# Patient Record
Sex: FEMALE | Race: BLACK OR AFRICAN AMERICAN | Employment: OTHER | URBAN - METROPOLITAN AREA
[De-identification: names, ages, dates, MRNs, and addresses within clinical notes are randomized per-mention and may not be internally consistent; named-entity substitution may affect disease eponyms.]

---

## 2017-03-24 ENCOUNTER — HOSPITAL ENCOUNTER (INPATIENT)
Age: 82
LOS: 7 days | Discharge: HOME | DRG: 192 | End: 2017-03-31
Attending: EMERGENCY MEDICINE | Admitting: HOSPITALIST
Payer: MEDICARE

## 2017-03-24 ENCOUNTER — APPOINTMENT (OUTPATIENT)
Dept: GENERAL RADIOLOGY | Age: 82
DRG: 192 | End: 2017-03-24
Attending: EMERGENCY MEDICINE
Payer: MEDICARE

## 2017-03-24 DIAGNOSIS — E87.6 HYPOKALEMIA: ICD-10-CM

## 2017-03-24 DIAGNOSIS — J44.1 COPD EXACERBATION (HCC): Primary | ICD-10-CM

## 2017-03-24 PROBLEM — R09.89 CHEST CONGESTION: Status: ACTIVE | Noted: 2017-03-24

## 2017-03-24 LAB
ALBUMIN SERPL BCP-MCNC: 3.6 G/DL (ref 3.4–5)
ALBUMIN/GLOB SERPL: 1 {RATIO} (ref 0.8–1.7)
ALP SERPL-CCNC: 103 U/L (ref 45–117)
ALT SERPL-CCNC: 20 U/L (ref 13–56)
ANION GAP BLD CALC-SCNC: 13 MMOL/L (ref 3–18)
AST SERPL W P-5'-P-CCNC: 14 U/L (ref 15–37)
ATRIAL RATE: 63 BPM
ATRIAL RATE: 99 BPM
BASOPHILS # BLD AUTO: 0 K/UL (ref 0–0.06)
BASOPHILS # BLD: 0 % (ref 0–2)
BILIRUB SERPL-MCNC: 0.4 MG/DL (ref 0.2–1)
BNP SERPL-MCNC: 219 PG/ML (ref 0–1800)
BUN SERPL-MCNC: 16 MG/DL (ref 7–18)
BUN/CREAT SERPL: 16 (ref 12–20)
CALCIUM SERPL-MCNC: 8.9 MG/DL (ref 8.5–10.1)
CALCULATED P AXIS, ECG09: 32 DEGREES
CALCULATED P AXIS, ECG09: 47 DEGREES
CALCULATED R AXIS, ECG10: -39 DEGREES
CALCULATED R AXIS, ECG10: -41 DEGREES
CALCULATED T AXIS, ECG11: 28 DEGREES
CALCULATED T AXIS, ECG11: 58 DEGREES
CHLORIDE SERPL-SCNC: 101 MMOL/L (ref 100–108)
CK MB CFR SERPL CALC: NORMAL % (ref 0–4)
CK MB SERPL-MCNC: <1 NG/ML (ref 5–25)
CK SERPL-CCNC: 33 U/L (ref 26–192)
CK SERPL-CCNC: 37 U/L (ref 26–192)
CK SERPL-CCNC: 43 U/L (ref 26–192)
CO2 SERPL-SCNC: 28 MMOL/L (ref 21–32)
CREAT SERPL-MCNC: 1.03 MG/DL (ref 0.6–1.3)
DIAGNOSIS, 93000: NORMAL
DIAGNOSIS, 93000: NORMAL
DIFFERENTIAL METHOD BLD: ABNORMAL
EOSINOPHIL # BLD: 0.2 K/UL (ref 0–0.4)
EOSINOPHIL NFR BLD: 3 % (ref 0–5)
ERYTHROCYTE [DISTWIDTH] IN BLOOD BY AUTOMATED COUNT: 12.8 % (ref 11.6–14.5)
GLOBULIN SER CALC-MCNC: 3.5 G/DL (ref 2–4)
GLUCOSE BLD STRIP.AUTO-MCNC: 199 MG/DL (ref 70–110)
GLUCOSE BLD STRIP.AUTO-MCNC: 221 MG/DL (ref 70–110)
GLUCOSE SERPL-MCNC: 119 MG/DL (ref 74–99)
HCT VFR BLD AUTO: 43.3 % (ref 35–45)
HGB BLD-MCNC: 14.8 G/DL (ref 12–16)
LIPASE SERPL-CCNC: 100 U/L (ref 73–393)
LYMPHOCYTES # BLD AUTO: 32 % (ref 21–52)
LYMPHOCYTES # BLD: 2.1 K/UL (ref 0.9–3.6)
MAGNESIUM SERPL-MCNC: 2 MG/DL (ref 1.8–2.4)
MCH RBC QN AUTO: 30.1 PG (ref 24–34)
MCHC RBC AUTO-ENTMCNC: 34.2 G/DL (ref 31–37)
MCV RBC AUTO: 88 FL (ref 74–97)
MONOCYTES # BLD: 0.5 K/UL (ref 0.05–1.2)
MONOCYTES NFR BLD AUTO: 7 % (ref 3–10)
NEUTS SEG # BLD: 3.7 K/UL (ref 1.8–8)
NEUTS SEG NFR BLD AUTO: 58 % (ref 40–73)
P-R INTERVAL, ECG05: 138 MS
P-R INTERVAL, ECG05: 178 MS
PLATELET # BLD AUTO: 331 K/UL (ref 135–420)
PMV BLD AUTO: 9.1 FL (ref 9.2–11.8)
POTASSIUM SERPL-SCNC: 2.7 MMOL/L (ref 3.5–5.5)
PROT SERPL-MCNC: 7.1 G/DL (ref 6.4–8.2)
Q-T INTERVAL, ECG07: 366 MS
Q-T INTERVAL, ECG07: 402 MS
QRS DURATION, ECG06: 82 MS
QRS DURATION, ECG06: 86 MS
QTC CALCULATION (BEZET), ECG08: 411 MS
QTC CALCULATION (BEZET), ECG08: 469 MS
RBC # BLD AUTO: 4.92 M/UL (ref 4.2–5.3)
SODIUM SERPL-SCNC: 142 MMOL/L (ref 136–145)
TROPONIN I SERPL-MCNC: <0.02 NG/ML (ref 0–0.06)
VENTRICULAR RATE, ECG03: 63 BPM
VENTRICULAR RATE, ECG03: 99 BPM
WBC # BLD AUTO: 6.5 K/UL (ref 4.6–13.2)

## 2017-03-24 PROCEDURE — 74011250636 HC RX REV CODE- 250/636: Performed by: EMERGENCY MEDICINE

## 2017-03-24 PROCEDURE — 74011000250 HC RX REV CODE- 250: Performed by: EMERGENCY MEDICINE

## 2017-03-24 PROCEDURE — 83735 ASSAY OF MAGNESIUM: CPT | Performed by: EMERGENCY MEDICINE

## 2017-03-24 PROCEDURE — 85025 COMPLETE CBC W/AUTO DIFF WBC: CPT | Performed by: EMERGENCY MEDICINE

## 2017-03-24 PROCEDURE — 99285 EMERGENCY DEPT VISIT HI MDM: CPT

## 2017-03-24 PROCEDURE — 74011250637 HC RX REV CODE- 250/637: Performed by: EMERGENCY MEDICINE

## 2017-03-24 PROCEDURE — 82550 ASSAY OF CK (CPK): CPT | Performed by: EMERGENCY MEDICINE

## 2017-03-24 PROCEDURE — 96366 THER/PROPH/DIAG IV INF ADDON: CPT

## 2017-03-24 PROCEDURE — 74011000250 HC RX REV CODE- 250: Performed by: HOSPITALIST

## 2017-03-24 PROCEDURE — 74011250637 HC RX REV CODE- 250/637: Performed by: HOSPITALIST

## 2017-03-24 PROCEDURE — 82962 GLUCOSE BLOOD TEST: CPT

## 2017-03-24 PROCEDURE — 93005 ELECTROCARDIOGRAM TRACING: CPT

## 2017-03-24 PROCEDURE — 94640 AIRWAY INHALATION TREATMENT: CPT

## 2017-03-24 PROCEDURE — 80053 COMPREHEN METABOLIC PANEL: CPT | Performed by: EMERGENCY MEDICINE

## 2017-03-24 PROCEDURE — 65270000029 HC RM PRIVATE

## 2017-03-24 PROCEDURE — 74011250636 HC RX REV CODE- 250/636: Performed by: HOSPITALIST

## 2017-03-24 PROCEDURE — 77030013140 HC MSK NEB VYRM -A

## 2017-03-24 PROCEDURE — 96367 TX/PROPH/DG ADDL SEQ IV INF: CPT

## 2017-03-24 PROCEDURE — 96365 THER/PROPH/DIAG IV INF INIT: CPT

## 2017-03-24 PROCEDURE — 83690 ASSAY OF LIPASE: CPT | Performed by: EMERGENCY MEDICINE

## 2017-03-24 PROCEDURE — 71010 XR CHEST PORT: CPT

## 2017-03-24 PROCEDURE — 83880 ASSAY OF NATRIURETIC PEPTIDE: CPT | Performed by: EMERGENCY MEDICINE

## 2017-03-24 PROCEDURE — 36415 COLL VENOUS BLD VENIPUNCTURE: CPT | Performed by: HOSPITALIST

## 2017-03-24 PROCEDURE — 96375 TX/PRO/DX INJ NEW DRUG ADDON: CPT

## 2017-03-24 RX ORDER — HYDROCODONE POLISTIREX AND CHLORPHENIRAMINE POLISTIREX 10; 8 MG/5ML; MG/5ML
5 SUSPENSION, EXTENDED RELEASE ORAL
Status: DISCONTINUED | OUTPATIENT
Start: 2017-03-24 | End: 2017-03-31 | Stop reason: HOSPADM

## 2017-03-24 RX ORDER — CLORAZEPATE DIPOTASSIUM 7.5 MG/1
7.5 TABLET ORAL 2 TIMES DAILY
Status: DISCONTINUED | OUTPATIENT
Start: 2017-03-24 | End: 2017-03-24

## 2017-03-24 RX ORDER — POTASSIUM CHLORIDE 7.45 MG/ML
10 INJECTION INTRAVENOUS
Status: COMPLETED | OUTPATIENT
Start: 2017-03-24 | End: 2017-03-24

## 2017-03-24 RX ORDER — DOCUSATE SODIUM 100 MG/1
100 CAPSULE, LIQUID FILLED ORAL 2 TIMES DAILY
Status: DISCONTINUED | OUTPATIENT
Start: 2017-03-24 | End: 2017-03-31 | Stop reason: HOSPADM

## 2017-03-24 RX ORDER — SODIUM CHLORIDE 0.9 % (FLUSH) 0.9 %
5-10 SYRINGE (ML) INJECTION AS NEEDED
Status: DISCONTINUED | OUTPATIENT
Start: 2017-03-24 | End: 2017-03-31 | Stop reason: HOSPADM

## 2017-03-24 RX ORDER — NICOTINE 7MG/24HR
1 PATCH, TRANSDERMAL 24 HOURS TRANSDERMAL
Status: DISCONTINUED | OUTPATIENT
Start: 2017-03-24 | End: 2017-03-31 | Stop reason: HOSPADM

## 2017-03-24 RX ORDER — MAGNESIUM SULFATE HEPTAHYDRATE 40 MG/ML
2 INJECTION, SOLUTION INTRAVENOUS
Status: COMPLETED | OUTPATIENT
Start: 2017-03-24 | End: 2017-03-24

## 2017-03-24 RX ORDER — GUAIFENESIN 100 MG/5ML
81 LIQUID (ML) ORAL DAILY
Status: DISCONTINUED | OUTPATIENT
Start: 2017-03-25 | End: 2017-03-31 | Stop reason: HOSPADM

## 2017-03-24 RX ORDER — KETOROLAC TROMETHAMINE 30 MG/ML
15 INJECTION, SOLUTION INTRAMUSCULAR; INTRAVENOUS
Status: ACTIVE | OUTPATIENT
Start: 2017-03-24 | End: 2017-03-29

## 2017-03-24 RX ORDER — FUROSEMIDE 40 MG/1
TABLET ORAL
COMMUNITY

## 2017-03-24 RX ORDER — CLOPIDOGREL BISULFATE 75 MG/1
75 TABLET ORAL DAILY
Status: DISCONTINUED | OUTPATIENT
Start: 2017-03-25 | End: 2017-03-31 | Stop reason: HOSPADM

## 2017-03-24 RX ORDER — HYDRALAZINE HYDROCHLORIDE 25 MG/1
50 TABLET, FILM COATED ORAL 3 TIMES DAILY
Status: DISCONTINUED | OUTPATIENT
Start: 2017-03-24 | End: 2017-03-28

## 2017-03-24 RX ORDER — INSULIN GLARGINE 100 [IU]/ML
0.2 INJECTION, SOLUTION SUBCUTANEOUS
Status: DISCONTINUED | OUTPATIENT
Start: 2017-03-24 | End: 2017-03-31

## 2017-03-24 RX ORDER — ENOXAPARIN SODIUM 100 MG/ML
40 INJECTION SUBCUTANEOUS EVERY 24 HOURS
Status: DISCONTINUED | OUTPATIENT
Start: 2017-03-24 | End: 2017-03-31 | Stop reason: HOSPADM

## 2017-03-24 RX ORDER — BUDESONIDE 0.5 MG/2ML
500 INHALANT ORAL
Status: DISCONTINUED | OUTPATIENT
Start: 2017-03-24 | End: 2017-03-31 | Stop reason: HOSPADM

## 2017-03-24 RX ORDER — IPRATROPIUM BROMIDE AND ALBUTEROL SULFATE 2.5; .5 MG/3ML; MG/3ML
3 SOLUTION RESPIRATORY (INHALATION)
Status: COMPLETED | OUTPATIENT
Start: 2017-03-24 | End: 2017-03-24

## 2017-03-24 RX ORDER — LEVOFLOXACIN 5 MG/ML
500 INJECTION, SOLUTION INTRAVENOUS EVERY 24 HOURS
Status: DISCONTINUED | OUTPATIENT
Start: 2017-03-24 | End: 2017-03-31 | Stop reason: HOSPADM

## 2017-03-24 RX ORDER — POTASSIUM CHLORIDE 750 MG/1
40 TABLET, EXTENDED RELEASE ORAL
Status: COMPLETED | OUTPATIENT
Start: 2017-03-24 | End: 2017-03-24

## 2017-03-24 RX ORDER — ONDANSETRON 2 MG/ML
4 INJECTION INTRAMUSCULAR; INTRAVENOUS
Status: DISCONTINUED | OUTPATIENT
Start: 2017-03-24 | End: 2017-03-31 | Stop reason: HOSPADM

## 2017-03-24 RX ORDER — LORAZEPAM 2 MG/ML
0.5 INJECTION INTRAMUSCULAR
Status: COMPLETED | OUTPATIENT
Start: 2017-03-24 | End: 2017-03-24

## 2017-03-24 RX ORDER — INSULIN LISPRO 100 [IU]/ML
INJECTION, SOLUTION INTRAVENOUS; SUBCUTANEOUS
Status: DISCONTINUED | OUTPATIENT
Start: 2017-03-24 | End: 2017-03-31 | Stop reason: HOSPADM

## 2017-03-24 RX ORDER — MAGNESIUM SULFATE 100 %
4 CRYSTALS MISCELLANEOUS AS NEEDED
Status: DISCONTINUED | OUTPATIENT
Start: 2017-03-24 | End: 2017-03-31 | Stop reason: HOSPADM

## 2017-03-24 RX ORDER — ARFORMOTEROL TARTRATE 15 UG/2ML
15 SOLUTION RESPIRATORY (INHALATION)
Status: DISCONTINUED | OUTPATIENT
Start: 2017-03-24 | End: 2017-03-31 | Stop reason: HOSPADM

## 2017-03-24 RX ORDER — LISINOPRIL 20 MG/1
20 TABLET ORAL DAILY
Status: DISCONTINUED | OUTPATIENT
Start: 2017-03-25 | End: 2017-03-31 | Stop reason: HOSPADM

## 2017-03-24 RX ORDER — ONDANSETRON 2 MG/ML
4 INJECTION INTRAMUSCULAR; INTRAVENOUS
Status: COMPLETED | OUTPATIENT
Start: 2017-03-24 | End: 2017-03-24

## 2017-03-24 RX ORDER — SODIUM CHLORIDE 0.9 % (FLUSH) 0.9 %
5-10 SYRINGE (ML) INJECTION EVERY 8 HOURS
Status: DISCONTINUED | OUTPATIENT
Start: 2017-03-24 | End: 2017-03-31 | Stop reason: HOSPADM

## 2017-03-24 RX ORDER — DEXTROSE 50 % IN WATER (D50W) INTRAVENOUS SYRINGE
25-50 AS NEEDED
Status: DISCONTINUED | OUTPATIENT
Start: 2017-03-24 | End: 2017-03-31 | Stop reason: HOSPADM

## 2017-03-24 RX ORDER — DIPHENHYDRAMINE HYDROCHLORIDE 50 MG/ML
12.5 INJECTION, SOLUTION INTRAMUSCULAR; INTRAVENOUS
Status: DISCONTINUED | OUTPATIENT
Start: 2017-03-24 | End: 2017-03-31 | Stop reason: HOSPADM

## 2017-03-24 RX ORDER — PANTOPRAZOLE SODIUM 40 MG/1
40 TABLET, DELAYED RELEASE ORAL
Status: DISCONTINUED | OUTPATIENT
Start: 2017-03-24 | End: 2017-03-31 | Stop reason: HOSPADM

## 2017-03-24 RX ORDER — HYDROCODONE BITARTRATE AND ACETAMINOPHEN 5; 325 MG/1; MG/1
1 TABLET ORAL
Status: DISCONTINUED | OUTPATIENT
Start: 2017-03-24 | End: 2017-03-31 | Stop reason: HOSPADM

## 2017-03-24 RX ORDER — AMITRIPTYLINE HYDROCHLORIDE 10 MG/1
10 TABLET, FILM COATED ORAL
Status: DISCONTINUED | OUTPATIENT
Start: 2017-03-24 | End: 2017-03-31 | Stop reason: HOSPADM

## 2017-03-24 RX ORDER — NALOXONE HYDROCHLORIDE 0.4 MG/ML
0.4 INJECTION, SOLUTION INTRAMUSCULAR; INTRAVENOUS; SUBCUTANEOUS AS NEEDED
Status: DISCONTINUED | OUTPATIENT
Start: 2017-03-24 | End: 2017-03-31 | Stop reason: HOSPADM

## 2017-03-24 RX ADMIN — IPRATROPIUM BROMIDE AND ALBUTEROL SULFATE 3 ML: .5; 3 SOLUTION RESPIRATORY (INHALATION) at 10:12

## 2017-03-24 RX ADMIN — LORAZEPAM 0.5 MG: 2 INJECTION INTRAMUSCULAR at 14:08

## 2017-03-24 RX ADMIN — POTASSIUM CHLORIDE 10 MEQ: 10 INJECTION, SOLUTION INTRAVENOUS at 12:39

## 2017-03-24 RX ADMIN — Medication 10 ML: at 14:08

## 2017-03-24 RX ADMIN — INSULIN GLARGINE 14 UNITS: 100 INJECTION, SOLUTION SUBCUTANEOUS at 23:11

## 2017-03-24 RX ADMIN — BUDESONIDE 500 MCG: 0.5 INHALANT RESPIRATORY (INHALATION) at 20:03

## 2017-03-24 RX ADMIN — POTASSIUM CHLORIDE 10 MEQ: 10 INJECTION, SOLUTION INTRAVENOUS at 11:30

## 2017-03-24 RX ADMIN — HYDROCODONE BITARTRATE AND ACETAMINOPHEN 1 TABLET: 5; 325 TABLET ORAL at 17:49

## 2017-03-24 RX ADMIN — ONDANSETRON 4 MG: 2 INJECTION INTRAMUSCULAR; INTRAVENOUS at 23:17

## 2017-03-24 RX ADMIN — ARFORMOTEROL TARTRATE 15 MCG: 15 SOLUTION RESPIRATORY (INHALATION) at 20:03

## 2017-03-24 RX ADMIN — MAGNESIUM SULFATE HEPTAHYDRATE 2 G: 40 INJECTION, SOLUTION INTRAVENOUS at 09:56

## 2017-03-24 RX ADMIN — IPRATROPIUM BROMIDE AND ALBUTEROL SULFATE 3 ML: .5; 3 SOLUTION RESPIRATORY (INHALATION) at 13:36

## 2017-03-24 RX ADMIN — METHYLPREDNISOLONE SODIUM SUCCINATE 125 MG: 125 INJECTION, POWDER, FOR SOLUTION INTRAMUSCULAR; INTRAVENOUS at 09:55

## 2017-03-24 RX ADMIN — METHYLPREDNISOLONE SODIUM SUCCINATE 40 MG: 40 INJECTION, POWDER, FOR SOLUTION INTRAMUSCULAR; INTRAVENOUS at 23:13

## 2017-03-24 RX ADMIN — METHYLPREDNISOLONE SODIUM SUCCINATE 40 MG: 40 INJECTION, POWDER, FOR SOLUTION INTRAMUSCULAR; INTRAVENOUS at 17:49

## 2017-03-24 RX ADMIN — HYDRALAZINE HYDROCHLORIDE 50 MG: 25 TABLET, FILM COATED ORAL at 17:49

## 2017-03-24 RX ADMIN — INSULIN LISPRO 2 UNITS: 100 INJECTION, SOLUTION INTRAVENOUS; SUBCUTANEOUS at 23:10

## 2017-03-24 RX ADMIN — ENOXAPARIN SODIUM 40 MG: 40 INJECTION SUBCUTANEOUS at 17:49

## 2017-03-24 RX ADMIN — PANTOPRAZOLE SODIUM 40 MG: 40 TABLET, DELAYED RELEASE ORAL at 17:49

## 2017-03-24 RX ADMIN — IPRATROPIUM BROMIDE AND ALBUTEROL SULFATE 3 ML: .5; 3 SOLUTION RESPIRATORY (INHALATION) at 11:27

## 2017-03-24 RX ADMIN — Medication 10 ML: at 12:40

## 2017-03-24 RX ADMIN — ONDANSETRON 4 MG: 2 INJECTION INTRAMUSCULAR; INTRAVENOUS at 19:22

## 2017-03-24 RX ADMIN — INSULIN LISPRO 4 UNITS: 100 INJECTION, SOLUTION INTRAVENOUS; SUBCUTANEOUS at 17:50

## 2017-03-24 RX ADMIN — ONDANSETRON 4 MG: 2 INJECTION INTRAMUSCULAR; INTRAVENOUS at 11:30

## 2017-03-24 RX ADMIN — POTASSIUM CHLORIDE 40 MEQ: 10 TABLET, EXTENDED RELEASE ORAL at 11:30

## 2017-03-24 RX ADMIN — IPRATROPIUM BROMIDE AND ALBUTEROL SULFATE 3 ML: .5; 3 SOLUTION RESPIRATORY (INHALATION) at 09:32

## 2017-03-24 RX ADMIN — LEVOFLOXACIN 500 MG: 5 INJECTION, SOLUTION INTRAVENOUS at 17:49

## 2017-03-24 NOTE — ED NOTES
TRANSFER - OUT REPORT:    Verbal report given to Cielo Woodard RN(name) on Madiha Carroll  being transferred to 319(unit) for routine progression of care       Report consisted of patients Situation, Background, Assessment and   Recommendations(SBAR). Information from the following report(s) SBAR, ED Summary, [facility] and Recent Results was reviewed with the receiving nurse. Lines:   Peripheral IV 03/24/17 Left Hand (Active)   Site Assessment Clean, dry, & intact 3/24/2017  9:39 AM   Phlebitis Assessment 0 3/24/2017  9:39 AM   Infiltration Assessment 0 3/24/2017  9:39 AM   Dressing Status Clean, dry, & intact 3/24/2017  9:39 AM   Dressing Type Transparent 3/24/2017  9:39 AM        Opportunity for questions and clarification was provided.       Patient transported with:   Monitor  Registered Nurse  Tech

## 2017-03-24 NOTE — IP AVS SNAPSHOT
[address] [address] 
[phone] Patient: Patsy Sanchez MRN: [id number] You are allergic to the following Allergen Reactions Codeine Itching Dilaudid (Hydromorphone (Bulk)) Other (comments) Feel like i am in another world. Percocet (Oxycodone-Acetaminophen) Itching Sulfa (Sulfonamide Antibiotics) Itching Recent Documentation Height Weight BMI OB Status Smoking Status 1.715 m 73.6 kg 25.03 kg/m2 Hysterectomy Current Every Day Smoker Emergency Contacts Name Discharge Info Relation Home Work Mobile Jose Roberto,[employer] DISCHARGE CAREGIVER [3] Child [2] [phone] Bia Ring  Other Relative [6] [phone] About your hospitalization You were admitted on:  2017 You last received care in the:  [address] [place] [address] You were discharged on:  2017 Unit phone number:  [phone] Why you were hospitalized Your primary diagnosis was:  Acute Exacerbation Of Copd With Asthma (Hcc) Your diagnoses also included:  Hypokalemia, Copd Exacerbation (Hcc), Dm (Diabetes Mellitus) (Hcc), Esophageal Reflux, Htn (Hypertension), Tobacco Use, Chest Congestion, Constipation Providers Seen During Your Hospitalizations Provider Role Specialty Primary office phone Dayan Guthrie MD Attending Provider Emergency Medicine [phone] Margaret Boyd MD Attending Provider Internal Medicine [phone] Your Primary Care Physician (PCP) Primary Care Physician Office Phone Office Fax [address], [address] [facility] [place] [phone] Follow-up Information Follow up With Details Comments Contact Info Maebelle Nissen, MD  Patient states that she already has an appointment with this physician. [address] 
[phone] Ophelia Pedro In 1 week Patient prefers to schedule her own follow-up appointment. [facility] [address] 
[phone] Current Discharge Medication List  
  
START taking these medications Dose & Instructions Dispensing Information Comments Morning Noon Evening Bedtime  
 fluticasone-salmeterol 250-50 mcg/dose diskus inhaler Commonly known as:  ADVAIR DISKUS Your next dose is:  TODAY Dose:  1 Puff Take 1 Puff by inhalation every twelve (12) hours. Quantity:  1 Inhaler Refills:  0  
     
   
   
  
   
  
 guaiFENesin  mg ER tablet Commonly known as:  Robert & Robert Your next dose is:  TODAY Dose:  600 mg Take 1 Tab by mouth every twelve (12) hours. Quantity:  20 Tab Refills:  0 Nicotine 21-14-7 mg/24 hr Ptds Your next dose is:  TOMORROW. REMOVE CURRENT ONE TONIGHT Dose:  1 Patch 1 Patch by TransDERmal route daily. Quantity:  56 Patch Refills:  0  
     
  
   
   
   
  
 predniSONE 10 mg tablet Commonly known as:  Earlmey Reveal Your next dose is:  TOMORROW Take two tabs for 2 days, one tab for 2 days, 0.5 tab for 2 days Quantity:  7 Tab Refills:  0 CONTINUE these medications which have CHANGED Dose & Instructions Dispensing Information Comments Morning Noon Evening Bedtime  
 amitriptyline 10 mg tablet Commonly known as:  ELAVIL What changed:  how much to take Your next dose is:  TODAY Dose:  10 mg Take 1 tablet by mouth nightly. Quantity:  30 tablet Refills:  0  
     
   
   
   
  
  
 glimepiride 1 mg tablet Commonly known as:  AMARYL What changed:  how much to take Your next dose is:  TOMORROW Dose:  1 mg Take 1 tablet by mouth Daily (before breakfast). Quantity:  30 tablet Refills:  0 CONTINUE these medications which have NOT CHANGED Dose & Instructions Dispensing Information Comments Morning Noon Evening Bedtime  
 albuterol 2.5 mg /3 mL (0.083 %) nebulizer solution Commonly known as:  PROVENTIL VENTOLIN Your next dose is:  TODAY Dose:  2.5 mg  
3 mL by Nebulization route every four (4) hours as needed for Wheezing. Quantity:  10 Package Refills:  0  
     
   
   
   
  
 albuterol-ipratropium 2.5 mg-0.5 mg/3 ml Nebu Commonly known as:  Christy Crissy Your next dose is:  TODAY Dose:  3 mL  
3 mL by Nebulization route every four (4) hours as needed. Quantity:  30 mL Refills:  0  
     
   
   
   
  
 aspirin 81 mg chewable tablet Your next dose is:  TOMORROW Dose:  81 mg Take 1 Tab by mouth daily. Quantity:  1 Tab Refills:  1  
     
  
   
   
   
  
 busPIRone 10 mg tablet Commonly known as:  BUSPAR Your next dose is:  TODAY Dose:  10 mg Take 10 mg by mouth three (3) times daily. Refills:  0  
     
   
   
   
  
  
 clopidogrel 75 mg Tab Commonly known as:  PLAVIX Your next dose is:  TOMORROW Dose:  75 mg Take 1 Tab by mouth daily. Quantity:  30 Tab Refills:  0  
     
   
   
   
  
 COZAAR 25 mg tablet Generic drug:  losartan Your next dose is:  TOMORROW Dose:  25 mg Take 25 mg by mouth daily. Indications: hypertension Refills:  0  
     
  
   
   
   
  
 cyclobenzaprine 5 mg tablet Commonly known as:  FLEXERIL Your next dose is:  TODAY Dose:  5 mg Take 5 mg by mouth three (3) times daily as needed for Muscle Spasm(s). Refills:  0  
     
   
   
   
  
 diazePAM 5 mg tablet Commonly known as:  VALIUM Dose:  5 mg Take 1 Tab by mouth every eight (8) hours as needed. Max Daily Amount: 15 mg. Quantity:  20 Tab Refills:  0  
     
   
   
   
  
 glucose 4 gram chewable tablet Your next dose is:  TODAY Dose:  16 g Take 4 tablets by mouth as needed. Quantity:  30 tablet Refills:  0  
     
   
   
   
  
 hydrALAZINE 50 mg tablet Commonly known as:  APRESOLINE Your next dose is:  TODAY Dose:  50 mg Take 1 Tab by mouth three (3) times daily. Quantity:  90 Tab Refills:  0 HYDROcodone-acetaminophen 5-325 mg per tablet Commonly known as:  [place] Your next dose is:  TODAY Dose:  1 Tab Take 1 Tab by mouth every four (4) hours as needed for Pain. Max Daily Amount: 6 Tabs. Quantity:  15 Tab Refills:  0  
     
   
   
   
  
 LASIX 40 mg tablet Generic drug:  furosemide Your next dose is:  April 3rd Take  by mouth Every Mon, Wed and Sat. Indications: Edema Refills:  0  
     
   
   
   
  
 lisinopril 20 mg tablet Commonly known as:  Jone [place] Your next dose is:  TOMORROW Dose:  20 mg Take 1 Tab by mouth daily. Quantity:  30 Tab Refills:  0  
     
  
   
   
   
  
 NIFEdipine ER 90 mg ER tablet Commonly known as:  PROCARDIA XL Your next dose is:  TOMORROW Dose:  90 mg Take 1 Tab by mouth daily. Quantity:  30 Tab Refills:  0  
     
  
   
   
   
  
 polyethylene glycol 17 gram packet Commonly known as:  Rimma Pile Your next dose is:  TOMORROW Dose:  17 g Take 1 Packet by mouth daily as needed for Other (constipation). Quantity:  30 Each Refills:  0  
     
   
   
   
  
 zolpidem 5 mg tablet Commonly known as:  AMBIEN Your next dose is:  TODAY Dose:  5 mg Take 1 Tab by mouth nightly as needed for Sleep. Max Daily Amount: 5 mg. Quantity:  5 Tab Refills:  0 STOP taking these medications POTASSIUM CHLORIDE SR 10 MEQ TAB Where to Get Your Medications Information on where to get these meds will be given to you by the nurse or doctor. ! Ask your nurse or doctor about these medications albuterol-ipratropium 2.5 mg-0.5 mg/3 ml Nebu  
 fluticasone-salmeterol 250-50 mcg/dose diskus inhaler  
 guaiFENesin  mg ER tablet Nicotine 21-14-7 mg/24 hr Ptds  
 predniSONE 10 mg tablet Discharge Instructions Lab Results Component Value Date/Time Hemoglobin A1c 6.7 03/25/2017 03:32 AM  
 
 
This lab test reflects that your blood sugar has been slightly elevated over the past 3 months and should be evaluated by your primary care provider. An A1C of 5.7-6.4% meets the criteria for pre-diabetes; an A1C of 6.5% or higher meets the criteria for diabetes. Steroids can increase your blood sugar so it is important to follow up with your provider to determine if your blood sugar has returned to normal or needs further treatment. This lab test reflects that your blood sugar averaged 146 mg/dl over the past 3 months. It is important to follow up with your provider on a routine basis to continue to evaluate your blood sugar and discuss any necessary changes in treatment. Discharge Orders None [employer] Announcement We are excited to announce that we are making your provider's discharge notes available to you in [employer]. You will see these notes when they are completed and signed by the physician that discharged you from your recent hospital stay. If you have any questions or concerns about any information you see in [employer], please call the Health Information Department where you were seen or reach out to your Primary Care Provider for more information about your plan of care. Introducing [facility] SERVICES! Keri Potts introduces [employer] patient portal. Now you can access parts of your medical record, email your doctor's office, and request medication refills online. 1. In your internet browser, go to [email or web address]. 2. Click on the First Time User? Click Here link in the Sign In box. You will see the New Member Sign Up page. 3. Enter your [employer] Access Code exactly as it appears below. You will not need to use this code after youve completed the sign-up process. If you do not sign up before the expiration date, you must request a new code. · [employer] Access Code: [email or web address] Expires: 5/11/2017  1:33 PM 
 
4. Enter the last four digits of your Social Security Number (xxxx) and Date of Birth (mm/dd/yyyy) as indicated and click Submit. You will be taken to the next sign-up page. 5. Create a [employer] ID. This will be your [employer] login ID and cannot be changed, so think of one that is secure and easy to remember. 6. Create a [employer] password. You can change your password at any time. 7. Enter your Password Reset Question and Answer. This can be used at a later time if you forget your password. 8. Enter your e-mail address. You will receive e-mail notification when new information is available in [address] 9. Click Sign Up. You can now view and download portions of your medical record. 10. Click the Download Summary menu link to download a portable copy of your medical information. If you have questions, please visit the Frequently Asked Questions section of the [employer] website. Remember, [employer] is NOT to be used for urgent needs. For medical emergencies, dial 911. Now available from your iPhone and Android! General Information Please provide this summary of care documentation to your next provider. Patient Signature:  ____________________________________________________________ Date:  ____________________________________________________________  
  
[facility] Provider Signature:  ____________________________________________________________ Date:  ____________________________________________________________

## 2017-03-24 NOTE — IP AVS SNAPSHOT
Current Discharge Medication List  
  
START taking these medications Dose & Instructions Dispensing Information Comments Morning Noon Evening Bedtime  
 fluticasone-salmeterol 250-50 mcg/dose diskus inhaler Commonly known as:  ADVAIR DISKUS Your next dose is:  TODAY Dose:  1 Puff Take 1 Puff by inhalation every twelve (12) hours. Quantity:  1 Inhaler Refills:  0  
     
   
   
  
   
  
 guaiFENesin  mg ER tablet Commonly known as:  [place] Robert Your next dose is:  TODAY Dose:  600 mg Take 1 Tab by mouth every twelve (12) hours. Quantity:  20 Tab Refills:  0 Nicotine 21-14-7 mg/24 hr Ptds Your next dose is:  TOMORROW. REMOVE CURRENT ONE TONIGHT Dose:  1 Patch 1 Patch by TransDERmal route daily. Quantity:  56 Patch Refills:  0  
     
  
   
   
   
  
 predniSONE 10 mg tablet Commonly known as:  [place] Red Your next dose is:  TOMORROW Take two tabs for 2 days, one tab for 2 days, 0.5 tab for 2 days Quantity:  7 Tab Refills:  0 CONTINUE these medications which have CHANGED Dose & Instructions Dispensing Information Comments Morning Noon Evening Bedtime  
 amitriptyline 10 mg tablet Commonly known as:  ELAVIL What changed:  how much to take Your next dose is:  TODAY Dose:  10 mg Take 1 tablet by mouth nightly. Quantity:  30 tablet Refills:  0  
     
   
   
   
  
  
 glimepiride 1 mg tablet Commonly known as:  AMARYL What changed:  how much to take Your next dose is:  TOMORROW Dose:  1 mg Take 1 tablet by mouth Daily (before breakfast). Quantity:  30 tablet Refills:  0 CONTINUE these medications which have NOT CHANGED Dose & Instructions Dispensing Information Comments Morning Noon Evening Bedtime  
 albuterol 2.5 mg /3 mL (0.083 %) nebulizer solution Commonly known as:  PROVENTIL VENTOLIN Your next dose is:  TODAY Dose:  2.5 mg  
3 mL by Nebulization route every four (4) hours as needed for Wheezing. Quantity:  10 Package Refills:  0  
     
   
   
   
  
 albuterol-ipratropium 2.5 mg-0.5 mg/3 ml Nebu Commonly known as:  Kerry Simmons Your next dose is:  TODAY Dose:  3 mL  
3 mL by Nebulization route every four (4) hours as needed. Quantity:  30 mL Refills:  0  
     
   
   
   
  
 aspirin 81 mg chewable tablet Your next dose is:  TOMORROW Dose:  81 mg Take 1 Tab by mouth daily. Quantity:  1 Tab Refills:  1  
     
  
   
   
   
  
 busPIRone 10 mg tablet Commonly known as:  BUSPAR Your next dose is:  TODAY Dose:  10 mg Take 10 mg by mouth three (3) times daily. Refills:  0  
     
   
   
   
  
  
 clopidogrel 75 mg Tab Commonly known as:  PLAVIX Your next dose is:  TOMORROW Dose:  75 mg Take 1 Tab by mouth daily. Quantity:  30 Tab Refills:  0  
     
   
   
   
  
 COZAAR 25 mg tablet Generic drug:  losartan Your next dose is:  TOMORROW Dose:  25 mg Take 25 mg by mouth daily. Indications: hypertension Refills:  0  
     
  
   
   
   
  
 cyclobenzaprine 5 mg tablet Commonly known as:  FLEXERIL Your next dose is:  TODAY Dose:  5 mg Take 5 mg by mouth three (3) times daily as needed for Muscle Spasm(s). Refills:  0  
     
   
   
   
  
 diazePAM 5 mg tablet Commonly known as:  VALIUM Dose:  5 mg Take 1 Tab by mouth every eight (8) hours as needed. Max Daily Amount: 15 mg. Quantity:  20 Tab Refills:  0  
     
   
   
   
  
 glucose 4 gram chewable tablet Your next dose is:  TODAY Dose:  16 g Take 4 tablets by mouth as needed. Quantity:  30 tablet Refills:  0  
     
   
   
   
  
 hydrALAZINE 50 mg tablet Commonly known as:  APRESOLINE Your next dose is:  TODAY  Dose:  50 mg  
 Take 1 Tab by mouth three (3) times daily. Quantity:  90 Tab Refills:  0 HYDROcodone-acetaminophen 5-325 mg per tablet Commonly known as:  Dung Hansen Your next dose is:  TODAY Dose:  1 Tab Take 1 Tab by mouth every four (4) hours as needed for Pain. Max Daily Amount: 6 Tabs. Quantity:  15 Tab Refills:  0  
     
   
   
   
  
 LASIX 40 mg tablet Generic drug:  furosemide Your next dose is:  April 3rd Take  by mouth Every Mon, Wed and Sat. Indications: Edema Refills:  0  
     
   
   
   
  
 lisinopril 20 mg tablet Commonly known as:  Reji Dance Your next dose is:  TOMORROW Dose:  20 mg Take 1 Tab by mouth daily. Quantity:  30 Tab Refills:  0  
     
  
   
   
   
  
 NIFEdipine ER 90 mg ER tablet Commonly known as:  PROCARDIA XL Your next dose is:  TOMORROW Dose:  90 mg Take 1 Tab by mouth daily. Quantity:  30 Tab Refills:  0  
     
  
   
   
   
  
 polyethylene glycol 17 gram packet Commonly known as:  Eric Spotted Your next dose is:  TOMORROW Dose:  17 g Take 1 Packet by mouth daily as needed for Other (constipation). Quantity:  30 Each Refills:  0  
     
   
   
   
  
 zolpidem 5 mg tablet Commonly known as:  AMBIEN Your next dose is:  TODAY Dose:  5 mg Take 1 Tab by mouth nightly as needed for Sleep. Max Daily Amount: 5 mg. Quantity:  5 Tab Refills:  0 STOP taking these medications POTASSIUM CHLORIDE SR 10 MEQ TAB Where to Get Your Medications Information on where to get these meds will be given to you by the nurse or doctor. ! Ask your nurse or doctor about these medications  
  albuterol-ipratropium 2.5 mg-0.5 mg/3 ml Nebu  
 fluticasone-salmeterol 250-50 mcg/dose diskus inhaler  
 guaiFENesin  mg ER tablet Nicotine 21-14-7 mg/24 hr Ptds  
 predniSONE 10 mg tablet

## 2017-03-24 NOTE — ED NOTES
Bedside report complete. Patient was introduced to oncoming Merced Araujo RN. Patient updated on plan of care. Safety check performed: Bed locked in low posiiton. Side rails up. Call bell and personal items within reach. Lab notified of need to have repeat ckmb repeated.

## 2017-03-24 NOTE — ED PROVIDER NOTES
HPI Comments:   9:01 AM    Madiha Carroll is a [de-identified] y.o. female with a hx of DM, high cholesterol, depression, anxiety, COPD, and HTN who presents to ED via EMS c/o SOB onset 2 days ago that worsened last night. Associated symptoms include cough, wheezing and nausea/vomiting/constiapation. Pt reports she was seen by her PCP 2 days ago, was Rx'd Flexeril for leg muscle pain, and was told to use the medications she has for her pulmonary sx. Pt states she thinks her bottom left lip is swelling. She reports she has tried Prednisone in the past but \"didn't have a good reaction to it. \" She reports she has tried using her nebulizer treatment at home with no relief. Pt reports she has been taking her DM medications. Pt states she has been hospitalized for pulmonary issues in the past. Lasix use. Admits to frequent tobacco use. PMHx of stress test 3 years ago. Pt denies known fever, hx of CHF, difficulty urinating, and any other symptoms or complaints. Patient is a [de-identified] y.o. female presenting with cough. The history is provided by the patient. No  was used. Cough   This is a new problem. The current episode started 2 days ago. The problem has been gradually worsening. There has been no fever. Associated symptoms include shortness of breath, wheezing, nausea and vomiting. Pertinent negatives include no chest pain, no chills, no headaches, no rhinorrhea, no sore throat and no myalgias.         Past Medical History:   Diagnosis Date    Anxiety     Arthritis     DJD    Arthropathy     Asthma     C. difficile diarrhea     COPD [facility]chronic obstructive pulmonary disease) ([facility] [address]     Depression     Depression     Diabetes (HCC)     [facility]deep venous thrombosis) ([facility]     Gastrointestinal disorder     GERD (gastroesophageal reflux disease)     High cholesterol     Hypertension     Pain, chronic     Pneumonia     SOB (shortness of breath)        Past Surgical History:   Procedure Laterality Date    HX CHOLECYSTECTOMY      HX CORONARY STENT PLACEMENT      HX GYN      c section    HX HYSTERECTOMY      HX ORTHOPAEDIC      back surgery    HX OTHER SURGICAL      laminectomy    NEUROLOGICAL PROCEDURE UNLISTED      back surgery         History reviewed. No pertinent family history. Social History     Social History    Marital status:      Spouse name: N/A    Number of children: N/A    Years of education: N/A     Occupational History    Not on file. Social History Main Topics    Smoking status: Current Every Day Smoker     Packs/day: 0.50    Smokeless tobacco: Never Used    Alcohol use Yes      Comment: social    Drug use: No    Sexual activity: Not on file     Other Topics Concern    Not on file     Social History Narrative         ALLERGIES: Codeine; Dilaudid [hydromorphone (bulk)]; Percocet [oxycodone-acetaminophen]; and Sulfa (sulfonamide antibiotics)    Review of Systems   Constitutional: Negative for appetite change, chills and fever. HENT: Negative for congestion, rhinorrhea and sore throat. Eyes: Negative for pain, discharge and visual disturbance. Respiratory: Positive for cough, shortness of breath and wheezing. Negative for chest tightness. Cardiovascular: Negative for chest pain and leg swelling. Gastrointestinal: Positive for constipation, nausea and vomiting. Negative for abdominal pain and diarrhea. Endocrine: Negative for polydipsia and polyuria. Genitourinary: Negative for difficulty urinating, dysuria, frequency, hematuria, menstrual problem, pelvic pain, urgency, vaginal bleeding and vaginal discharge. Musculoskeletal: Negative for arthralgias, back pain and myalgias. Skin: Negative for color change. Neurological: Negative for weakness, numbness and headaches. Hematological: Does not bruise/bleed easily. Psychiatric/Behavioral: Negative for decreased concentration. All other systems reviewed and are negative.       Vitals:    03/24/17 1330 03/24/17 1345 03/24/17 1400 03/24/17 1415   BP: 163/75 169/76 153/68 (!) 158/99   Pulse: (!) 103 (!) 103 (!) 107    Resp: 12 11 15 16   Temp:   98.5 °F (36.9 °C)    SpO2: 98% 100% 96% 98%   Weight:       Height:                Physical Exam     -------------------------PHYSICAL EXAM-------------------------    Vital signs and nursing notes reviewed  Nursing note and VS were reviewed      CONSTITUTIONAL: Well developed, well nourished and appears adequately hydrated. Awake and alert. Not diaphoretic. Afebrile. Mild respiratory distress. HEAD: Normocephalic, Atraumatic. EYES: Pupils are equal, round and reactive. Extra-ocular movements intact. Sclera anicteric. Conjunctiva not injected. ENT: Mucous membranes are moist. There is no erythema or swelling of the mucosal tissues or enlargement of the tonsillar tissue or the presence of exudates. No oral lesions or thrush. Both EAC's are without swelling or erythema. Both TM's unremarkable. Nasal mucosa pink with no discharge and the turbinates are of normal size. Lips are dry. While I cannot appreciate any significant swelling of the lips, pt claims that there is some mild swelling of the mucosal aspect of the lower lip. NECK: Normal ROM. Neck is supple. No JVD. No posterior cervical paraspinal or midline tenderness. No obvious enlargement of the thyroid. No significant anterior cervical adenopathy. Trachea is midline. CARDIOVASCULAR:  regular rhythm. No murmurs, rubs or gallops. Distal pulses are 2+ and equal.    PULMONARY:  Patient is speaking in full sentences. Clear to auscultation bilaterally. Diffuse wheezes with poor air exchange. No rales or rhonchi. CHEST WALL: Normal shape;  non tender to palpation; no crepitus    ABDOMINAL: Soft and non-distended. Flat. Mild diffuse tenderness which appears to be chronic in nature. NO peritoneal signs - No rebound, guarding or rigidity. Active bowel sounds present.     BACK: No thoracolumbar midline or paraspinal tenderness. Full range of motion. No CVA tenderness. MUSCULOSKELETAL: No obvious soft tissue tenderness or sites of bony tenderness or deformities; Full range of motion in all extremities. No obvious muscle tenderness. No joint inflammation. LE: 1+ bilateral pitting edema. SKIN: Skin is warm and dry. Good skin turgor. No diaphoresis, lesions,  Rashes, or petechiae. Cap Refill is Normal. No cyanosis. NEUROLOGICAL: Alert, awake and appropriately oriented. Normal speech. CN's are normal;  Motor - no focal weakness; no obvious sensory loss; Cerebellar function- intact; DTR's - 2+ equal    PSYCH: Appropriate affect; normal thought content; no expressed suicidal ideation. MDM  Number of Diagnoses or Management Options  COPD exacerbation ([facility] [address]): Hypokalemia:   Diagnosis management comments: INITIAL CLINICAL IMPRESSION and PLANS:  The patient presents with the primary complaint(s) of: dyspnea The presentation, to include historical aspects and clinical findings appear to be consistent with the DX of COPD exacerbation. However, other possible DX's to consider as primary, associated with, or exacerbated by include:    1. CHF  2. Pneumonia  3. ACS    Considering the above, my initial management plan to evaluate and therapeutic interventions include: Obtain Lab Studies,, Obtain Radiologic studies, and Initiate Medications and or IV Fluids,  As well as those noted in the orders.    Brian Leyva MD        Amount and/or Complexity of Data Reviewed  Clinical lab tests: ordered and reviewed  Tests in the radiology section of CPT®: ordered and reviewed (CXR)  Tests in the medicine section of CPT®: ordered and reviewed (EKG)  Discuss the patient with other providers: yes Robin Leach MD (Internal Medicine))  Independent visualization of images, tracings, or specimens: yes (CXR, EKG)      ED Course       Procedures    PROGRESS NOTE:   10:05 AM  Pt has been re-examined by Consuelo Pal MD. Pt sounds improved but ist still wheezing and is nauseated. PROGRESS NOTE:   11:03 AM  Pt has been re-examined by Benjamin Ventura MD. Pt continues to wheeze and c/o a substernal heaviness sensation which started after the nebulizer treatment. Her initial set of cardiac enzymes were negative. Impression: suspect discomfort is more related to bronchospasm. Will repeat EKG. PROGRESS NOTE:   1:01 PM  Pt has been re-examined by Benjamin Ventura MD. Pt is continuing to wheeze despite the nebulizer treatment. She is c/o pain in her arm with the infusion of potassium and is getting anxious. Repeat EKG shows no acute changes. It appears that she probably will not improve sufficiently to be discharged. Anticipating admission. CONSULT NOTE:   2:05 PM  Benjamin Ventura MD  spoke with Ilda Cornejo MD via telephone consult  Specialty: Internal Medicine   Discussed pt's hx, disposition, available diagnostic, and imaging results. Reviewed care plans. Consulting physician agrees with plans as outlined. She will admit pt to medical.      ED CLINICAL SUMMARY - ADMIT     2: 05 PM  CLINICAL COURSE while in the ED:      Intervention)s) while in ED:  ACTIONS / APPROACH: Based on the presenting SUBACUTE history of dyspnea. My initial focus was to Determine the cause and extent of the problem and Initiate Treatment as Appropriate . Details of actions taken are noted below. SPECIFICS REGARDING APPROACH:     1.  DIAGNOSTIC RESULTS:   EKG interpretation: (Preliminary)  9:19   Sinus rhythm with occasoinal premature ventricular complexes, 63 bpm, left axis deviation, minimal voltage criteria for LVH, may be normal variant, inferior infarct, age undetermined, anterior infarct, age undetermined, compared to 9/19/16: no significant changes  EKG read by Benjamin Ventura MD     EKG interpretation: (Secondary)  12:22  NSR, 99 bpm, left axis deviation, moderate voltage criteria for LVH, may be normal variant, inferior infarct, age undetermined, anterior infarct, age undetermined, no change from prior  EKG read by Keshia Dial MD     XR CHEST PORT   Final Result  IMPRESSION:     No acute pulmonary process identified.    As read by the radiologist.              Labs Reviewed   CBC WITH AUTOMATED DIFF - Abnormal; Notable for the following:        Result Value    MPV 9.1 (*)     All other components within normal limits   METABOLIC PANEL, COMPREHENSIVE - Abnormal; Notable for the following:     Potassium 2.7 (*)     Glucose 119 (*)     GFR est non-AA 52 (*)     AST (SGOT) 14 (*)     All other components within normal limits   MAGNESIUM   CARDIAC PANEL,(CK, CKMB & TROPONIN)   PRO-BNP   LIPASE   CARDIAC PANEL,(CK, CKMB & TROPONIN)        Recent Results (from the past 12 hour(s))   EKG, 12 LEAD, INITIAL    Collection Time: 03/24/17  9:19 AM   Result Value Ref Range    Ventricular Rate 63 BPM    Atrial Rate 63 BPM    P-R Interval 138 ms    QRS Duration 86 ms    Q-T Interval 402 ms    QTC Calculation (Bezet) 411 ms    Calculated P Axis 47 degrees    Calculated R Axis -41 degrees    Calculated T Axis 58 degrees    Diagnosis       Sinus rhythm with occasional premature ventricular complexes  Left axis deviation  Minimal voltage criteria for LVH, may be normal variant  Inferior infarct (cited on or before 28-MAR-2016)  Anterior infarct , age undetermined  Abnormal ECG  When compared with ECG of 19-SEP-2016 12:03,  premature ventricular complexes are now present     CBC WITH AUTOMATED DIFF    Collection Time: 03/24/17  9:34 AM   Result Value Ref Range    WBC 6.5 4.6 - 13.2 K/uL    RBC 4.92 4.20 - 5.30 M/uL    HGB 14.8 12.0 - 16.0 g/dL    HCT 43.3 35.0 - 45.0 %    MCV 88.0 74.0 - 97.0 FL    MCH 30.1 24.0 - 34.0 PG    MCHC 34.2 31.0 - 37.0 g/dL    RDW 12.8 11.6 - 14.5 %    PLATELET 767 410 - 631 K/uL    MPV 9.1 (L) 9.2 - 11.8 FL    NEUTROPHILS 58 40 - 73 %    LYMPHOCYTES 32 21 - 52 %    MONOCYTES 7 3 - 10 %    EOSINOPHILS 3 0 - 5 %    BASOPHILS 0 0 - 2 %    ABS. NEUTROPHILS 3.7 1.8 - 8.0 K/UL    ABS. LYMPHOCYTES 2.1 0.9 - 3.6 K/UL    ABS. MONOCYTES 0.5 0.05 - 1.2 K/UL    ABS. EOSINOPHILS 0.2 0.0 - 0.4 K/UL    ABS. BASOPHILS 0.0 0.0 - 0.06 K/UL    DF AUTOMATED     METABOLIC PANEL, COMPREHENSIVE    Collection Time: 03/24/17  9:34 AM   Result Value Ref Range    Sodium 142 136 - 145 mmol/L    Potassium 2.7 (LL) 3.5 - 5.5 mmol/L    Chloride 101 100 - 108 mmol/L    CO2 28 21 - 32 mmol/L    Anion gap 13 3.0 - 18 mmol/L    Glucose 119 (H) 74 - 99 mg/dL    BUN 16 7.0 - 18 MG/DL    Creatinine 1.03 0.6 - 1.3 MG/DL    BUN/Creatinine ratio 16 12 - 20      GFR est AA >60 >60 ml/min/1.73m2    GFR est non-AA 52 (L) >60 ml/min/1.73m2    Calcium 8.9 8.5 - 10.1 MG/DL    Bilirubin, total 0.4 0.2 - 1.0 MG/DL    ALT (SGPT) 20 13 - 56 U/L    AST (SGOT) 14 (L) 15 - 37 U/L    Alk.  phosphatase 103 45 - 117 U/L    Protein, total 7.1 6.4 - 8.2 g/dL    Albumin 3.6 3.4 - 5.0 g/dL    Globulin 3.5 2.0 - 4.0 g/dL    A-G Ratio 1.0 0.8 - 1.7     MAGNESIUM    Collection Time: 03/24/17  9:34 AM   Result Value Ref Range    Magnesium 2.0 1.8 - 2.4 mg/dL   CARDIAC PANEL,(CK, CKMB & TROPONIN)    Collection Time: 03/24/17  9:34 AM   Result Value Ref Range    CK 37 26 - 192 U/L    CK - MB <1.0 <3.6 ng/ml    CK-MB Index Cannot be calulated 0.0 - 4.0 %    Troponin-I, Qt. <0.02 0.00 - 0.06 NG/ML   PRO-BNP    Collection Time: 03/24/17  9:34 AM   Result Value Ref Range    NT pro- 0 - 1800 PG/ML   EKG, 12 LEAD, SUBSEQUENT    Collection Time: 03/24/17 12:22 PM   Result Value Ref Range    Ventricular Rate 99 BPM    Atrial Rate 99 BPM    P-R Interval 178 ms    QRS Duration 82 ms    Q-T Interval 366 ms    QTC Calculation (Bezet) 469 ms    Calculated P Axis 32 degrees    Calculated R Axis -39 degrees    Calculated T Axis 28 degrees    Diagnosis       Normal sinus rhythm  Left axis deviation  Moderate voltage criteria for LVH, may be normal variant  Inferior infarct (cited on or before 28-MAR-2016)  Anterior infarct (cited on or before 28-MAR-2016)  Abnormal ECG  When compared with ECG of 24-MAR-2017 09:19,  premature ventricular complexes are no longer present  Vent. rate has increased BY  36 BPM  QT has lengthened     LIPASE    Collection Time: 03/24/17 12:40 PM   Result Value Ref Range    Lipase 100 73 - 393 U/L   CARDIAC PANEL,(CK, CKMB & TROPONIN)    Collection Time: 03/24/17 12:40 PM   Result Value Ref Range    CK 33 26 - 192 U/L    CK - MB <1.0 <3.6 ng/ml    CK-MB Index Cannot be calulated 0.0 - 4.0 %    Troponin-I, Qt. <0.02 0.00 - 0.06 NG/ML       2. MEDICATIONS GIVEN:   Medications   sodium chloride (NS) flush 5-10 mL (10 mL IntraVENous Given 3/24/17 1408)   sodium chloride (NS) flush 5-10 mL (not administered)   albuterol-ipratropium (DUO-NEB) 2.5 MG-0.5 MG/3 ML (3 mL Nebulization Given 3/24/17 0932)   methylPREDNISolone (PF) (SOLU-MEDROL) injection 125 mg (125 mg IntraVENous Given 3/24/17 0955)   magnesium sulfate 2 g/50 ml IVPB (premix or compounded) (0 g IntraVENous IV Completed 3/24/17 1130)   ondansetron (ZOFRAN) injection 4 mg (4 mg IntraVENous Given 3/24/17 1130)   albuterol-ipratropium (DUO-NEB) 2.5 MG-0.5 MG/3 ML (3 mL Nebulization Given 3/24/17 1012)   potassium chloride 10 mEq in 100 ml IVPB (0 mEq IntraVENous IV Completed 3/24/17 1330)   potassium chloride (K-DUR, KLOR-CON) tablet 40 mEq (40 mEq Oral Given 3/24/17 1130)   albuterol-ipratropium (DUO-NEB) 2.5 MG-0.5 MG/3 ML (3 mL Nebulization Given 3/24/17 1127)   albuterol-ipratropium (DUO-NEB) 2.5 MG-0.5 MG/3 ML (3 mL Nebulization Given 3/24/17 1336)   LORazepam (ATIVAN) injection 0.5 mg (0.5 mg IntraVENous Given 3/24/17 3276)           Response to Intervention(s):   IMPROVED       Unanticipated Developments: NONE     ED COURSE - General Comment:  During the ED course I had re-evaluated the patient, answered their and /or their family's questions regarding my clinical impression, the patient's condition and plans for therapeutic interventions.  The patient's ED course was uneventful and remained stable throughout. CLINICAL IMPRESSION AND DISCUSSION:     I reviewed our electronic medical record system for any past medical records that were available that may contribute to the patients current condition, the nursing notes and vital signs from today's visit. Based on the clinical presentation, findings and results of diagnostic studies, as well as developments while in the ED,  I suspect the following: For the presentation as noted above -     My clinical impression is: COPD exacerbation      DISCUSSION REGARDING CLINICAL IMPRESSION:    At this time there is no clinical evidence to support other pertinent diagnostic considerations such as:  N/A    Finally, other diagnoses  during this visit are noted below in Clinical Impression. CONCERNS / CONSIDERATIONS / JUSTIFICATION:  Pt will need continued neb treatments and replacement of the potassium    DISPOSITION DECISION:     ADMIT:  Based the my repeated evaluations to assess the patient's clinical response, the existence of underlying and / or new clinical conditions,and / or specific logistic considerations, I feel that hospitalization is warranted to continue ongoing monitoring and patient care. I have reviewed this information and my rationale to the patient and/or their family. The patient expresses agreement and understanding for the need to be admitted and of the admission diagnosis. Consultation will be made now with the inpatient physician for hospitalization. Patient's condition upon admission from the ED: CONDITION STABILIZED    CONDITIONS ON ADMISSION:  Deep Vein Thrombosis is not present at the time of admission. Thrombosis is not present at the time of admission. Urinary Tract Infection is not present at the time of admission. Pneumonia is not present at the time of admission. MRSA is not present at the time of admission. Wound infection is not present at the time of admission. Pressure Ulcer is not present at the time of admission. CLINICAL IMPRESSION  1. COPD exacerbation ([address]    2. Hypokalemia        PLAN:  1. ADMIT TO:   Vicky Morales MD      ATTESTATIONS:  This note is prepared by Riley Barry, acting as Scribe for Karen Ellis MD.    Karen Ellis MD: The scribe's documentation has been prepared under my direction and personally reviewed by me in its entirety. I confirm that the note above accurately reflects all work, treatment, procedures, and medical decision making performed by me.

## 2017-03-24 NOTE — PROGRESS NOTES
Readmission Risk Assessment:     Moderate Risk and [facility] patients    RRAT Score:  13-20    Initial Assessment:chart reviewed pt being admitted for COPD exacerbation,waiting on inpatient bed,unit cm will cont to follow thru on discharge planning. Emergency Contact:  See chart    Pertinent Medical Hx:  See chart       PCP/Specialists: Alvarez Naylor      Community Services:       DME:          Moderate Risk Care Transition Plan:  1. Evaluate for [facility] or [facility], SNF, acute rehab, community care coordination of resources. 2. Involve patient/caregiver in assessment, planning, education and implement of intervention. 3. CM daily patient care huddles/interdisciplinary rounds. 4. PCP/Specialist appointment within 5  7 days made prior to discharge. 5. Facilitate transportation and logistics for follow-up appointments. 6. Medication reconciliation [id number]  7. Formal handoff between hospital provider and post-acute provider to transition patient  Handoff to [address] Nurse Navigator or PCP practice.

## 2017-03-24 NOTE — H&P
History & Physical    Patient: Jaspal Quiroga MRN: [id number]  CSN: [id number]    YOB: 1935  Age: [de-identified] y.o. Sex: female      DOA: 3/24/2017  Primary Care Provider:  Spring Corral MD      Assessment/Plan     Patient Active Problem List   Diagnosis Code    DM (diabetes mellitus) ([facility] E11.9    HTN (hypertension) I10    Esophageal reflux K21.9    Urinary retention with incomplete bladder emptying R33.9    Unspecified constipation K59.00    Bronchitis J40    Acute exacerbation of COPD with asthma ([facility] J44.1, J45.901    Tobacco use Z72.0    Increased urinary frequency R35.0    Hypokalemia E87.6    COPD exacerbation ([place] [address].) J44.1         Admit to medical with remote tele     1 Copd exacerbation with asthma, smoker   -will give breathing, levoquin and pulmcort . Robtussin,  -continue iv steroid,   2. DM type II   po meds at home, will give labtus 14  and ssi due to steroid use. Hypoglycemia protocol   3. HTN, accelerated  Continue home medication   4. Hypokalemia  Given in Ed, will continue monitor . 5. Tobacco use   nicotine patch and education   6 reflux disease  Increase ppi   7 chest congestion/chest discomfort    ce negative for 2, will continue check one ce set, likely due to reflux disease    will repeat echo   Full code   DVT : lovenox. ppi proph  CC: sob        HPI:     Jaspal Quiroga is a [de-identified] y.o. female who hx of copd, dm, htn came to ED due sob for two days. She reported that her sob worsening today with wheezing and cough. She used breathing tx at home, not improving. She also reported \"chest congestion\", pain from left shoulder to left rib, some times down to esophageal. The pain worsening while moving  Left shoulder-f/u with pcm for the problem. In ER, 2 ce set negative, iv steroid and iv mg. k was replaced, cxr:No acute pulmonary process identified    Denies any slurred speech/headache/n/v/blurred vission/d/c/palpitation/gait change/bleeding. She is a  Smoker, no  drug use.  Drink alcohol, but not daily drinker. On admission,  Visit Vitals    BP (!) 158/99    Pulse (!) 107    Temp 98.5 °F (36.9 °C)    Resp 16    Ht 5' 7.5\" (1.715 m)    Wt 68 kg (150 lb)    SpO2 98%    BMI 23.15 kg/m2      O2 Device: Room air      Past Medical History:   Diagnosis Date    Anxiety     Arthritis     DJD    Arthropathy     Asthma     C. difficile diarrhea     COPD (chronic obstructive pulmonary disease) (HCC)     Depression     Depression     Diabetes (HCC)     [facility]     Gastrointestinal disorder     GERD (gastroesophageal reflux disease)     High cholesterol     Hypertension     Pain, chronic     Pneumonia     SOB (shortness of breath)        Past Surgical History:   Procedure Laterality Date    HX CHOLECYSTECTOMY      HX CORONARY STENT PLACEMENT      HX GYN      c section    HX HYSTERECTOMY      HX ORTHOPAEDIC      back surgery    HX OTHER SURGICAL      laminectomy    NEUROLOGICAL PROCEDURE UNLISTED      back surgery       History reviewed. No pertinent family history. Social History     Social History    Marital status:      Spouse name: N/A    Number of children: N/A    Years of education: N/A     Social History Main Topics    Smoking status: Current Every Day Smoker     Packs/day: 0.50    Smokeless tobacco: Never Used    Alcohol use Yes      Comment: social    Drug use: No    Sexual activity: Not Asked     Other Topics Concern    None     Social History Narrative       Prior to Admission medications    Medication Sig Start Date End Date Taking? Authorizing Provider   HYDROcodone-acetaminophen (NORCO) 5-325 mg per tablet Take 1 Tab by mouth every four (4) hours as needed for Pain. Max Daily Amount: 6 Tabs. 9/19/16   OBINNA Pan   diazepam (VALIUM) 5 mg tablet Take 1 Tab by mouth every eight (8) hours as needed.  Max Daily Amount: 15 mg. 9/19/16   OBINNA Pan   albuterol (PROVENTIL VENTOLIN) 2.5 mg /3 mL (0.083 %) nebulizer solution 3 mL by Nebulization route every four (4) hours as needed for Wheezing. 4/6/16   Amanda Adkins MD   albuterol-ipratropium (DUO-NEB) 2.5 mg-0.5 mg/3 ml nebu 3 mL by Nebulization route every four (4) hours as needed. 4/6/16   Amanda Adkins MD   clopidogrel (PLAVIX) 75 mg tablet Take 1 Tab by mouth daily. 4/6/16   Amanda Adkins MD   hydrALAZINE (APRESOLINE) 50 mg tablet Take 1 Tab by mouth three (3) times daily. 4/6/16   Amanda Adkins MD   polyethylene glycol [place]) 17 gram packet Take 1 Packet by mouth daily as needed for Other (constipation). 4/6/16   Amanda Adkins MD   NIFEdipine ER (PROCARDIA XL) 90 mg ER tablet Take 1 Tab by mouth daily. 4/6/16   Amanda Adkins MD   lisinopril (PRINIVIL, ZESTRIL) 20 mg tablet Take 1 Tab by mouth daily. 4/6/16   Amanda Adkins MD   zolpidem (AMBIEN) 5 mg tablet Take 1 Tab by mouth nightly as needed for Sleep. Max Daily Amount: 5 mg. 4/6/16   Amanda Adkins MD   glucose 4 gram chewable tablet Take 4 tablets by mouth as needed. 12/23/14   Jacky Frazier MD   amitriptyline (ELAVIL) 10 mg tablet Take 1 tablet by mouth nightly. 12/23/14   Jacky Frazier MD   glimepiride (AMARYL) 1 mg tablet Take 1 tablet by mouth Daily (before breakfast). 12/23/14   Jacky Frazier MD   aspirin 81 mg chewable tablet Take 1 Tab by mouth daily. 8/22/12   Ryley Vale MD   clorazepate (TRANXENE T-TAB) 7.5 mg tablet Take 7.5 mg by mouth two (2) times a day. Rosie Flores MD       Allergies   Allergen Reactions    Codeine Itching    Dilaudid [Hydromorphone (Bulk)] Other (comments)     Feel like i am in another world.  Percocet [Oxycodone-Acetaminophen] Itching    Sulfa (Sulfonamide Antibiotics) Itching       Review of Systems  Gen: No fever, + chills, no malaise, weight loss/gain. Heent: No headache, rhinorrhea, epistaxis, ear pain, hearing loss, sinus pain, neck pain/stiffness, sore throat. Heart: +chest congestion and chest discomfort. No  palpitations, NIEVES, pnd, or orthopnea.    Resp: cough, no hemoptysis, + wheezing and shortness of breath. GI: No nausea, vomiting, diarrhea, constipation, melena or hematochezia. : No urinary obstruction, dysuria or hematuria. Derm: No rash, new skin lesion or pruritis. Musc/skeletal: left shoulder pain   Vasc: No edema, cyanosis or claudication. Endo: No heat/cold intolerance, no polyuria,polydipsia or polyphagia. Neuro: No unilateral weakness, numbness, tingling. No seizures. Heme: No easy bruising or bleeding. Physical Exam:     Physical Exam:  Visit Vitals    BP (!) 158/99    Pulse (!) 107    Temp 98.5 °F (36.9 °C)    Resp 16    Ht 5' 7.5\" (1.715 m)    Wt 68 kg (150 lb)    SpO2 98%    BMI 23.15 kg/m2      O2 Device: Room air    Temp (24hrs), Av.7 °F (37.1 °C), Min:98.5 °F (36.9 °C), Max:98.9 °F (37.2 °C)             General:  Awake, cooperative, no distress. Head:  Normocephalic, without obvious abnormality, atraumatic. Eyes:  Conjunctivae/corneas clear, sclera anicteric, PERRL, EOMs intact. Nose: Nares normal. No drainage or sinus tenderness. Throat: Lips, mucosa, and tongue normal. .   Neck: Supple, symmetrical, trachea midline, no adenopathy. Lungs:   Wheezing and increased BS        Heart:  Tachycardia , S1, S2 normal, no  murmur, click, rub or gallop. Abdomen: Soft, non-tender. Bowel sounds normal. No masses,  No organomegaly. Extremities: Extremities normal, atraumatic, no cyanosis or edema. Pulses: 2+ and symmetric all extremities. Skin: Skin color-pink, texture, turgor normal. No rashes or lesions. Capillary refill normal    Neurologic: CNII-XII intact. No focal motor or sensory deficit.        Labs Reviewed:    BMP:   Lab Results   Component Value Date/Time     2017 09:34 AM    K 2.7 (LL) 2017 09:34 AM     2017 09:34 AM    CO2 28 2017 09:34 AM    AGAP 13 2017 09:34 AM     (H) 2017 09:34 AM    BUN 16 2017 09:34 AM    CREA 1.03 2017 09:34 AM    GFRAA >60 03/24/2017 09:34 AM    GFRNA 52 (L) 03/24/2017 09:34 AM     CMP:   Lab Results   Component Value Date/Time     03/24/2017 09:34 AM    K 2.7 (LL) 03/24/2017 09:34 AM     03/24/2017 09:34 AM    CO2 28 03/24/2017 09:34 AM    AGAP 13 03/24/2017 09:34 AM     (H) 03/24/2017 09:34 AM    BUN 16 03/24/2017 09:34 AM    CREA 1.03 03/24/2017 09:34 AM    GFRAA >60 03/24/2017 09:34 AM    GFRNA 52 (L) 03/24/2017 09:34 AM    CA 8.9 03/24/2017 09:34 AM    MG 2.0 03/24/2017 09:34 AM    ALB 3.6 03/24/2017 09:34 AM    TP 7.1 03/24/2017 09:34 AM    GLOB 3.5 03/24/2017 09:34 AM    AGRAT 1.0 03/24/2017 09:34 AM    SGOT 14 (L) 03/24/2017 09:34 AM    ALT 20 03/24/2017 09:34 AM     CBC:   Lab Results   Component Value Date/Time    WBC 6.5 03/24/2017 09:34 AM    HGB 14.8 03/24/2017 09:34 AM    HCT 43.3 03/24/2017 09:34 AM     03/24/2017 09:34 AM     All Cardiac Markers in the last 24 hours:   Lab Results   Component Value Date/Time    CPK 33 03/24/2017 12:40 PM    CPK 37 03/24/2017 09:34 AM    CKMB <1.0 03/24/2017 12:40 PM    CKMB <1.0 03/24/2017 09:34 AM    CKND1 Cannot be calulated 03/24/2017 12:40 PM    CKND1 Cannot be calulated 03/24/2017 09:34 AM    TROIQ <0.02 03/24/2017 12:40 PM    TROIQ <0.02 03/24/2017 09:34 AM     Recent Glucose Results:   Lab Results   Component Value Date/Time     (H) 03/24/2017 09:34 AM     ABG: No results found for: PH, PHI, PCO2, PCO2I, PO2, PO2I, HCO3, HCO3I, FIO2, FIO2I  COAGS: No results found for: APTT, PTP, INR  Liver Panel:   Lab Results   Component Value Date/Time    ALB 3.6 03/24/2017 09:34 AM    TP 7.1 03/24/2017 09:34 AM    GLOB 3.5 03/24/2017 09:34 AM    AGRAT 1.0 03/24/2017 09:34 AM    SGOT 14 (L) 03/24/2017 09:34 AM    ALT 20 03/24/2017 09:34 AM     03/24/2017 09:34 AM     Pancreatic Markers:   Lab Results   Component Value Date/Time    LPSE 100 03/24/2017 12:40 PM       Procedures/imaging: see electronic medical records for all procedures/Xrays and details which were not copied into this note but were reviewed prior to creation of Catie Olivo MD, Internal Medicine     CC: Collin Solorio MD

## 2017-03-24 NOTE — ROUTINE PROCESS
Bedside shift change report given to Helen Tucker RN (oncoming nurse) by Anselm Dakins, RN   (offgoing nurse). Report included the following information SBAR, Kardex, ED Summary, Intake/Output, MAR, Recent Results, Med Rec Status and Cardiac Rhythm Sinus Tach.

## 2017-03-24 NOTE — Clinical Note
Status[de-identified] Inpatient [101] Type of Bed: Medical [8] Inpatient Hospitalization Certified Necessary for the Following Reasons: 3. Patient receiving treatment that can only be provided in an inpatient setting (further clarification in H&P documentation) Admitting Diagnosis: [facility] exacerbation ([facility] [3194405] Admitting Diagnosis: Hypokalemia [300636] Admitting Physician: Petey Mendoza [[id number]] Attending Physician: Petey Mendoza [[id number]] Estimated Length of Stay: > or = to 2 Midnights Discharge Plan[de-identified] Home with Office Follow-up

## 2017-03-24 NOTE — ED NOTES
Pt lying on stretcher, reports no pain at this time, no distress noted, Pt remains on monitor and call bell within reach, denies needing bathroom,  room safety check completed, informed of status, awaiting results, will continue to monitor.

## 2017-03-24 NOTE — PROGRESS NOTES
Shift summary: pt pleasant and cooperative with care. Rec'd prn Norco x1 for a headache. Pt ordered dinner. Granddaughter at bedside visiting. Pt up with rollator as needed. No c/o SOB voiced.

## 2017-03-25 LAB
ANION GAP BLD CALC-SCNC: 11 MMOL/L (ref 3–18)
BASOPHILS # BLD AUTO: 0 K/UL (ref 0–0.06)
BASOPHILS # BLD: 0 % (ref 0–2)
BUN SERPL-MCNC: 17 MG/DL (ref 7–18)
BUN/CREAT SERPL: 15 (ref 12–20)
CALCIUM SERPL-MCNC: 9 MG/DL (ref 8.5–10.1)
CHLORIDE SERPL-SCNC: 103 MMOL/L (ref 100–108)
CO2 SERPL-SCNC: 27 MMOL/L (ref 21–32)
CREAT SERPL-MCNC: 1.15 MG/DL (ref 0.6–1.3)
DIFFERENTIAL METHOD BLD: ABNORMAL
EOSINOPHIL # BLD: 0 K/UL (ref 0–0.4)
EOSINOPHIL NFR BLD: 0 % (ref 0–5)
ERYTHROCYTE [DISTWIDTH] IN BLOOD BY AUTOMATED COUNT: 12.9 % (ref 11.6–14.5)
EST. AVERAGE GLUCOSE BLD GHB EST-MCNC: 146 MG/DL
GLUCOSE BLD STRIP.AUTO-MCNC: 173 MG/DL (ref 70–110)
GLUCOSE BLD STRIP.AUTO-MCNC: 186 MG/DL (ref 70–110)
GLUCOSE BLD STRIP.AUTO-MCNC: 200 MG/DL (ref 70–110)
GLUCOSE BLD STRIP.AUTO-MCNC: 207 MG/DL (ref 70–110)
GLUCOSE SERPL-MCNC: 188 MG/DL (ref 74–99)
HBA1C MFR BLD: 6.7 % (ref 4.5–5.6)
HCT VFR BLD AUTO: 39.3 % (ref 35–45)
HGB BLD-MCNC: 13.4 G/DL (ref 12–16)
LYMPHOCYTES # BLD AUTO: 12 % (ref 21–52)
LYMPHOCYTES # BLD: 1.1 K/UL (ref 0.9–3.6)
MAGNESIUM SERPL-MCNC: 2.3 MG/DL (ref 1.8–2.4)
MCH RBC QN AUTO: 29.8 PG (ref 24–34)
MCHC RBC AUTO-ENTMCNC: 34.1 G/DL (ref 31–37)
MCV RBC AUTO: 87.5 FL (ref 74–97)
MONOCYTES # BLD: 0.1 K/UL (ref 0.05–1.2)
MONOCYTES NFR BLD AUTO: 1 % (ref 3–10)
NEUTS SEG # BLD: 8.5 K/UL (ref 1.8–8)
NEUTS SEG NFR BLD AUTO: 87 % (ref 40–73)
PLATELET # BLD AUTO: 312 K/UL (ref 135–420)
PMV BLD AUTO: 9 FL (ref 9.2–11.8)
POTASSIUM SERPL-SCNC: 3.2 MMOL/L (ref 3.5–5.5)
RBC # BLD AUTO: 4.49 M/UL (ref 4.2–5.3)
SODIUM SERPL-SCNC: 141 MMOL/L (ref 136–145)
WBC # BLD AUTO: 9.8 K/UL (ref 4.6–13.2)

## 2017-03-25 PROCEDURE — 74011250637 HC RX REV CODE- 250/637: Performed by: HOSPITALIST

## 2017-03-25 PROCEDURE — 36415 COLL VENOUS BLD VENIPUNCTURE: CPT | Performed by: HOSPITALIST

## 2017-03-25 PROCEDURE — 85025 COMPLETE CBC W/AUTO DIFF WBC: CPT | Performed by: HOSPITALIST

## 2017-03-25 PROCEDURE — 74011250637 HC RX REV CODE- 250/637: Performed by: FAMILY MEDICINE

## 2017-03-25 PROCEDURE — 94760 N-INVAS EAR/PLS OXIMETRY 1: CPT

## 2017-03-25 PROCEDURE — 93306 TTE W/DOPPLER COMPLETE: CPT

## 2017-03-25 PROCEDURE — 94640 AIRWAY INHALATION TREATMENT: CPT

## 2017-03-25 PROCEDURE — 74011250637 HC RX REV CODE- 250/637: Performed by: INTERNAL MEDICINE

## 2017-03-25 PROCEDURE — 97165 OT EVAL LOW COMPLEX 30 MIN: CPT

## 2017-03-25 PROCEDURE — 83735 ASSAY OF MAGNESIUM: CPT | Performed by: HOSPITALIST

## 2017-03-25 PROCEDURE — 83036 HEMOGLOBIN GLYCOSYLATED A1C: CPT | Performed by: HOSPITALIST

## 2017-03-25 PROCEDURE — 74011250636 HC RX REV CODE- 250/636: Performed by: HOSPITALIST

## 2017-03-25 PROCEDURE — 82962 GLUCOSE BLOOD TEST: CPT

## 2017-03-25 PROCEDURE — 65270000029 HC RM PRIVATE

## 2017-03-25 PROCEDURE — 74011000250 HC RX REV CODE- 250: Performed by: HOSPITALIST

## 2017-03-25 PROCEDURE — 97161 PT EVAL LOW COMPLEX 20 MIN: CPT

## 2017-03-25 PROCEDURE — 80048 BASIC METABOLIC PNL TOTAL CA: CPT | Performed by: HOSPITALIST

## 2017-03-25 RX ORDER — POTASSIUM CHLORIDE 20 MEQ/1
40 TABLET, EXTENDED RELEASE ORAL
Status: COMPLETED | OUTPATIENT
Start: 2017-03-25 | End: 2017-03-25

## 2017-03-25 RX ORDER — POLYETHYLENE GLYCOL 3350 17 G/17G
17 POWDER, FOR SOLUTION ORAL
Status: DISCONTINUED | OUTPATIENT
Start: 2017-03-25 | End: 2017-03-31 | Stop reason: HOSPADM

## 2017-03-25 RX ORDER — LOSARTAN POTASSIUM 25 MG/1
25 TABLET ORAL DAILY
COMMUNITY

## 2017-03-25 RX ORDER — DIAZEPAM 5 MG/1
5 TABLET ORAL
Status: DISCONTINUED | OUTPATIENT
Start: 2017-03-25 | End: 2017-03-31 | Stop reason: HOSPADM

## 2017-03-25 RX ORDER — BUSPIRONE HYDROCHLORIDE 5 MG/1
10 TABLET ORAL 3 TIMES DAILY
Status: DISCONTINUED | OUTPATIENT
Start: 2017-03-25 | End: 2017-03-31 | Stop reason: HOSPADM

## 2017-03-25 RX ORDER — BENZONATATE 100 MG/1
100 CAPSULE ORAL
Status: DISCONTINUED | OUTPATIENT
Start: 2017-03-25 | End: 2017-03-31 | Stop reason: HOSPADM

## 2017-03-25 RX ORDER — GUAIFENESIN 600 MG/1
600 TABLET, EXTENDED RELEASE ORAL EVERY 12 HOURS
Status: DISCONTINUED | OUTPATIENT
Start: 2017-03-25 | End: 2017-03-31 | Stop reason: HOSPADM

## 2017-03-25 RX ORDER — BUSPIRONE HYDROCHLORIDE 10 MG/1
10 TABLET ORAL 3 TIMES DAILY
COMMUNITY
End: 2017-07-07

## 2017-03-25 RX ORDER — CYCLOBENZAPRINE HCL 5 MG
5 TABLET ORAL
COMMUNITY

## 2017-03-25 RX ORDER — ZOLPIDEM TARTRATE 5 MG/1
5 TABLET ORAL
Status: DISCONTINUED | OUTPATIENT
Start: 2017-03-25 | End: 2017-03-31 | Stop reason: HOSPADM

## 2017-03-25 RX ORDER — CYCLOBENZAPRINE HCL 10 MG
5 TABLET ORAL
Status: DISCONTINUED | OUTPATIENT
Start: 2017-03-25 | End: 2017-03-31 | Stop reason: HOSPADM

## 2017-03-25 RX ADMIN — DOCUSATE SODIUM 100 MG: 100 CAPSULE, LIQUID FILLED ORAL at 09:18

## 2017-03-25 RX ADMIN — INSULIN LISPRO 4 UNITS: 100 INJECTION, SOLUTION INTRAVENOUS; SUBCUTANEOUS at 21:30

## 2017-03-25 RX ADMIN — ENOXAPARIN SODIUM 40 MG: 40 INJECTION SUBCUTANEOUS at 18:32

## 2017-03-25 RX ADMIN — BUDESONIDE 500 MCG: 0.5 INHALANT RESPIRATORY (INHALATION) at 20:35

## 2017-03-25 RX ADMIN — Medication 10 ML: at 03:19

## 2017-03-25 RX ADMIN — HYDRALAZINE HYDROCHLORIDE 50 MG: 25 TABLET, FILM COATED ORAL at 21:30

## 2017-03-25 RX ADMIN — METHYLPREDNISOLONE SODIUM SUCCINATE 40 MG: 40 INJECTION, POWDER, FOR SOLUTION INTRAMUSCULAR; INTRAVENOUS at 12:06

## 2017-03-25 RX ADMIN — INSULIN LISPRO 2 UNITS: 100 INJECTION, SOLUTION INTRAVENOUS; SUBCUTANEOUS at 12:05

## 2017-03-25 RX ADMIN — Medication 10 ML: at 16:06

## 2017-03-25 RX ADMIN — POTASSIUM CHLORIDE 40 MEQ: 20 TABLET, EXTENDED RELEASE ORAL at 12:29

## 2017-03-25 RX ADMIN — GUAIFENESIN 600 MG: 600 TABLET, EXTENDED RELEASE ORAL at 21:19

## 2017-03-25 RX ADMIN — HYDROCODONE POLISTIREX AND CHLORPHENIRAMINE POLISTIREX 5 ML: 10; 8 SUSPENSION, EXTENDED RELEASE ORAL at 00:28

## 2017-03-25 RX ADMIN — BUDESONIDE 500 MCG: 0.5 INHALANT RESPIRATORY (INHALATION) at 07:39

## 2017-03-25 RX ADMIN — INSULIN LISPRO 2 UNITS: 100 INJECTION, SOLUTION INTRAVENOUS; SUBCUTANEOUS at 02:00

## 2017-03-25 RX ADMIN — DOCUSATE SODIUM 100 MG: 100 CAPSULE, LIQUID FILLED ORAL at 21:19

## 2017-03-25 RX ADMIN — LEVOFLOXACIN 500 MG: 5 INJECTION, SOLUTION INTRAVENOUS at 16:07

## 2017-03-25 RX ADMIN — AMITRIPTYLINE HYDROCHLORIDE 10 MG: 10 TABLET, FILM COATED ORAL at 00:28

## 2017-03-25 RX ADMIN — INSULIN LISPRO 4 UNITS: 100 INJECTION, SOLUTION INTRAVENOUS; SUBCUTANEOUS at 16:33

## 2017-03-25 RX ADMIN — HYDROCODONE BITARTRATE AND ACETAMINOPHEN 1 TABLET: 5; 325 TABLET ORAL at 21:28

## 2017-03-25 RX ADMIN — METHYLPREDNISOLONE SODIUM SUCCINATE 40 MG: 40 INJECTION, POWDER, FOR SOLUTION INTRAMUSCULAR; INTRAVENOUS at 23:25

## 2017-03-25 RX ADMIN — DIAZEPAM 5 MG: 5 TABLET ORAL at 16:06

## 2017-03-25 RX ADMIN — Medication 10 ML: at 07:08

## 2017-03-25 RX ADMIN — ARFORMOTEROL TARTRATE 15 MCG: 15 SOLUTION RESPIRATORY (INHALATION) at 20:35

## 2017-03-25 RX ADMIN — CARBIDOPA AND LEVODOPA 10 MG: 25; 100 TABLET, EXTENDED RELEASE ORAL at 17:00

## 2017-03-25 RX ADMIN — HYDROCODONE BITARTRATE AND ACETAMINOPHEN 1 TABLET: 5; 325 TABLET ORAL at 09:18

## 2017-03-25 RX ADMIN — Medication 10 ML: at 00:28

## 2017-03-25 RX ADMIN — METHYLPREDNISOLONE SODIUM SUCCINATE 40 MG: 40 INJECTION, POWDER, FOR SOLUTION INTRAMUSCULAR; INTRAVENOUS at 06:27

## 2017-03-25 RX ADMIN — PANTOPRAZOLE SODIUM 40 MG: 40 TABLET, DELAYED RELEASE ORAL at 16:05

## 2017-03-25 RX ADMIN — CLOPIDOGREL BISULFATE 75 MG: 75 TABLET, FILM COATED ORAL at 09:18

## 2017-03-25 RX ADMIN — INSULIN GLARGINE 14 UNITS: 100 INJECTION, SOLUTION SUBCUTANEOUS at 21:19

## 2017-03-25 RX ADMIN — ONDANSETRON 4 MG: 2 INJECTION INTRAMUSCULAR; INTRAVENOUS at 03:18

## 2017-03-25 RX ADMIN — HYDRALAZINE HYDROCHLORIDE 50 MG: 25 TABLET, FILM COATED ORAL at 16:05

## 2017-03-25 RX ADMIN — DOCUSATE SODIUM 100 MG: 100 CAPSULE, LIQUID FILLED ORAL at 00:28

## 2017-03-25 RX ADMIN — PANTOPRAZOLE SODIUM 40 MG: 40 TABLET, DELAYED RELEASE ORAL at 07:07

## 2017-03-25 RX ADMIN — METHYLPREDNISOLONE SODIUM SUCCINATE 40 MG: 40 INJECTION, POWDER, FOR SOLUTION INTRAMUSCULAR; INTRAVENOUS at 18:32

## 2017-03-25 RX ADMIN — ONDANSETRON 4 MG: 2 INJECTION INTRAMUSCULAR; INTRAVENOUS at 12:06

## 2017-03-25 RX ADMIN — CYCLOBENZAPRINE HYDROCHLORIDE 5 MG: 10 TABLET, FILM COATED ORAL at 16:06

## 2017-03-25 RX ADMIN — ARFORMOTEROL TARTRATE 15 MCG: 15 SOLUTION RESPIRATORY (INHALATION) at 07:39

## 2017-03-25 RX ADMIN — ASPIRIN 81 MG 81 MG: 81 TABLET ORAL at 09:17

## 2017-03-25 RX ADMIN — HYDRALAZINE HYDROCHLORIDE 50 MG: 25 TABLET, FILM COATED ORAL at 09:17

## 2017-03-25 RX ADMIN — LISINOPRIL 20 MG: 20 TABLET ORAL at 09:18

## 2017-03-25 RX ADMIN — AMITRIPTYLINE HYDROCHLORIDE 10 MG: 10 TABLET, FILM COATED ORAL at 21:19

## 2017-03-25 RX ADMIN — HYDROCODONE POLISTIREX AND CHLORPHENIRAMINE POLISTIREX 5 ML: 10; 8 SUSPENSION, EXTENDED RELEASE ORAL at 16:18

## 2017-03-25 RX ADMIN — GUAIFENESIN 600 MG: 600 TABLET, EXTENDED RELEASE ORAL at 12:29

## 2017-03-25 RX ADMIN — CARBIDOPA AND LEVODOPA 10 MG: 25; 100 TABLET, EXTENDED RELEASE ORAL at 21:19

## 2017-03-25 RX ADMIN — NIFEDIPINE 90 MG: 30 TABLET, FILM COATED, EXTENDED RELEASE ORAL at 09:18

## 2017-03-25 NOTE — PROGRESS NOTES
Problem: Mobility Impaired (Adult and Pediatric)  Goal: *Acute Goals and Plan of Care (Insert Text)  Physical Therapy Goals  Initiated 3/25/2017 and to be accomplished within 1 day(s)  1. Patient will move from supine <> sit with S in prep for out of bed activity and change of position. 2. Patient will perform sit<> stand with S with rolling walker in prep for transfers/ambulation. 3. Patient will ambulate 100+ feet with LRAD for increase functional mobility. Outcome: Resolved/Met Date Met:  03/25/17  PHYSICAL THERAPY EVALUATION & DISCHARGE     Patient: Mario Fontanez (14 y.o. female)  Date: 3/25/2017  Primary Diagnosis: COPD exacerbation (HCC)  Hypokalemia  COPD exacerbation (HCC)  Hypokalemia  Precautions:       ASSESSMENT AND RECOMMENDATIONS:  Based on the objective data described below, the patient presents with ability to complete functional mobility tasks with modified independence including transfers and gt 110ft with rollator. Pt with overall ROM/motor performance decreased but functional UE's/LE's. Was receiving OPPT to address painful left knee by pt report. Recommend pt resume as appropriate upon discharge. Pt may continue ambulation with family/staff supervision. Skilled physical therapy is not indicated at this time. Discharge Recommendations: resume out PT upon discharge as appropriate as PTA  Further Equipment Recommendations for Discharge: N/A        SUBJECTIVE:   Patient stated I don't need any physical therapy.   I take my therapy at the [facility].:       Past Medical History:   Diagnosis Date    Anxiety      Arthritis       DJD    Arthropathy      Asthma      C. difficile diarrhea      [facility]      Depression      Depression      Diabetes (HCC)      DVT [facility]deep venous [facility]) ([facility]      Gastrointestinal disorder      GERD (gastroesophageal reflux disease)      High cholesterol      Hypertension      Pain, chronic      Pneumonia      SOB (shortness of breath)       Past Surgical History:   Procedure Laterality Date    HX CHOLECYSTECTOMY        HX CORONARY STENT PLACEMENT        HX GYN         c section    HX HYSTERECTOMY        HX ORTHOPAEDIC         back surgery    HX OTHER SURGICAL         laminectomy    NEUROLOGICAL PROCEDURE UNLISTED         back surgery     Barriers to Learning/Limitations: None  Compensate with: visual, verbal, tactile, kinesthetic cues/model  Prior Level of Function/Home Situation: ambulating with rollator PT and receiving OP PT to address left knee pain  Home Situation  Home Environment: Independent living [place]  One/Two Story Residence: One story  Living Alone: Yes  Support Systems: Child(jose m)  Patient Expects to be Discharged to[de-identified] Apartment  Current DME Used/Available at Home: Walker, rollator (Elevated Toilet )  Critical Behavior:  Neurologic State: Alert; Appropriate for age  Orientation Level: Oriented X4  Cognition: Appropriate decision making; Appropriate for age attention/concentration; Appropriate safety awareness  Safety/Judgement: Awareness of environment; Fall prevention  Psychosocial  Patient Behaviors: Agitated  Purposeful Interaction: Yes  Pt Identified Daily Priority: Clinical issues (comment)  Caritas Process: Nurture loving kindness;Establish trust  Caring Interventions: Reassure  Reassure: Therapeutic listening  Strength:    Strength: Generally decreased, functional  Tone & Sensation:   Sensation: Intact  Range Of Motion:  AROM: Generally decreased, functional  Functional Mobility:  Bed Mobility:  Supine to Sit: Modified independent  Sit to Supine: Modified independent  Scooting: Modified independent  Transfers:  Sit to Stand: Modified independent  Stand to Sit: Modified independent  Balance:   Sitting: Intact  Standing: Intact; With support  Ambulation/Gait Training:  Distance (ft): 110 Feet (ft)  Assistive Device: Gait belt;Walker, rollator  Ambulation - Level of Assistance: Modified independent;Supervision  Gait Abnormalities: Other (forward trunk)  Speed/Cat: Pace decreased (<100 feet/min)  Pain:  Pain Scale 1: Numeric (0 - 10)  Pain Intensity 1: 7  Pain Location 1: Abdomen  Pain Orientation 1: Left  Pain Description 1: Aching  Pain Intervention(s) 1: Nurse notified  Activity Tolerance:   Good   Please refer to the flowsheet for vital signs taken during this treatment. After treatment:   [X]         Patient left in no apparent distress sitting up in chair  [ ]         Patient left in no apparent distress in bed  [X]         Call bell left within reach  [X]         Nursing present _Josephine  [ ]         Caregiver present  [ ]         Bed alarm activated      COMMUNICATION/EDUCATION:   [X]         Fall prevention education was provided and the patient/caregiver indicated understanding. [ ]         Patient/family have participated as able in goal setting and plan of care. [ ]         Patient/family agree to work toward stated goals and plan of care. [ ]         Patient understands intent and goals of therapy, but is neutral about his/her participation. [ ]         Patient is unable to participate in goal setting and plan of care. Eval Complexity: History: HIGH Complexity :3+ comorbidities / personal factors will impact the outcome/ POC Exam:LOW Complexity : 1-2 Standardized tests and measures addressing body structure, function, activity limitation and / or participation in recreation  Presentation: LOW Complexity : Stable, uncomplicated  Clinical Decision Making:Low Complexity  Overall Complexity:LOW      G-Codes (GP)  Mobility  [id number] Current  CI= 1-19%   Goal  CI= 1-19%  D/C  CI= 1-19%  The severity rating is based on the functional mobility assessment.      Thank you for this referral.  Wild Blancas, PT   Time Calculation: 9 mins

## 2017-03-25 NOTE — PROGRESS NOTES
Shift Summary:  Unable to sleep most of night except for brief periods. Concerned about not getting flexeril and buspar (doasges unknown). Several attempts to contact outside pharmacy unsuccessful. Up with assistance to bathroom. Lungs diminished with expiratory wheezing and a congested non productive cough. Continues on telemetry remote.

## 2017-03-25 NOTE — PROGRESS NOTES
Problem: Self Care Deficits Care Plan (Adult)  Goal: *Acute Goals and Plan of Care (Insert Text)  OCCUPATIONAL THERAPY EVALUATION/DISCHARGE     Patient: Derek Reyes (4 y.o. female)  Date: 3/25/2017  Primary Diagnosis: COPD exacerbation (HCC)  Hypokalemia  COPD exacerbation (HCC)  Hypokalemia  Precautions:       ASSESSMENT AND RECOMMENDATIONS:  Based on the objective data described below, the patient presents with ability to perform functional mobility, functional transfers, and LE dressing with Mod I. Pt was agitated with nursing in regards to her medications upon arrival. Pt initially was not agreeable to participate but then agreed to participate. Pt completed bed mobility with Mod I. Pt performed sit/stand from EOB with Mod I. Pt performed in-room and bathroom mobility with Rollator and Mod I. Pt stimulated ability to complete LB dressing with Mod I. Pt denied any further weakness compared to prior admission; just only dizziness. Pt report of completing ADLs the same as prior. Pt is at baseline with ADLs and functional transfers/mobility. Pt denied needing any skilled OT services at time. Pt is being discharged. Communicated with the nursing staff of patient's concerns on her medication. Per nursing, pt had taken her medications that she is thinking she did not receive. Communicated with patient of findings and appeared to calm patient. Pt was left with all needs met. Skilled occupational therapy is not indicated at this time. Discharge Recommendations: None  Further Equipment Recommendations for Discharge: none        SUBJECTIVE:   Patient stated I think they are lying to me.       OBJECTIVE DATA SUMMARY:       Past Medical History:   Diagnosis Date    Anxiety      Arthritis       DJD    Arthropathy      Asthma      C. difficile diarrhea      [facility]      Depression      Depression      Diabetes (HCC)      [facility]) [facility]      Gastrointestinal disorder      GERD (gastroesophageal reflux disease)      High cholesterol      Hypertension      Pain, chronic      Pneumonia      SOB (shortness of breath)       Past Surgical History:   Procedure Laterality Date    HX CHOLECYSTECTOMY        HX CORONARY STENT PLACEMENT        HX GYN         c section    HX HYSTERECTOMY        HX ORTHOPAEDIC         back surgery    HX OTHER SURGICAL         laminectomy    NEUROLOGICAL PROCEDURE UNLISTED         back surgery     Barriers to Learning/Limitations: None  Compensate with: visual, verbal, tactile, kinesthetic cues/model  Prior Level of Function/Home Situation: Mod I prior with ADLs and IADLs. Home Situation  Home Environment: Independent living  One/Two Story Residence: One story  Living Alone: Yes  Support Systems: Child(jose m)  Patient Expects to be Discharged to[de-identified] Apartment  Current DME Used/Available at Home: Walker, rollator (Elevated Toilet )  Cognitive/Behavioral Status:  Neurologic State: Alert; Appropriate for age  Orientation Level: Oriented X4  Cognition: Appropriate decision making; Appropriate for age attention/concentration; Appropriate safety awareness  Safety/Judgement: Awareness of environment; Fall prevention  Skin: BUE skin intact  Edema: none noted  Coordination:  Coordination: Generally decreased, functional  Fine Motor Skills-Upper: Left Intact; Right Intact    Gross Motor Skills-Upper: Left Intact; Right Intact  Strength: (B) UE  Strength: Generally decreased, functional  Tone & Sensation:(B) UE   Sensation: Intact   Range of Motion:(B) UE  AROM: Generally decreased, functional   Functional Mobility and Transfers for ADLs:  Bed Mobility:   Supine to Sit: Modified independent  Sit to Supine: Modified independent  Scooting: Modified independent  Transfers:  Sit to Stand: Modified independent               ADL Assessment/Intervention[de-identified]   Lower Body Dressing: Modified independent (stimulated)  Toileting:  (per patient; she has been completing tolieting independently)   Cognitive Retraining  Safety/Judgement: Awareness of environment; Fall prevention  Pain:  Pain Scale 1: Numeric (0 - 10)  Pain Intensity 1: 10  Pain Location 1: Hip  Pain Orientation 1: Left  Pain Description 1: Sharp  Pain Intervention(s) 1: Medication (see MAR)  Activity Tolerance:   Pt tolerated treatment well no signs of fatigue or increase pain     Please refer to the flowsheet for vital signs taken during this treatment. After treatment:   [ ]  Patient left in no apparent distress sitting up in chair  [ ]  Patient sitting on EOB  [X]  Patient left in no apparent distress in bed  [X]  Call bell left within reach  [X]  Nursing notified Giulia Marquez)  [ ]  Caregiver present  [ ]  CPM placed on  knee  [ ]  Bed alarm activated      COMMUNICATION/EDUCATION:   Communication/Collaboration:  [X]      Home safety education was provided and the patient/caregiver indicated understanding. [X]      Patient/family have participated as able and agree with findings and recommendations. [ ]      Patient is unable to participate in plan of care at this time.      Michelet Queen OT  Time Calculation: 20 mins

## 2017-03-25 NOTE — PROGRESS NOTES
OT orders received and chart reviewed. Attempted to complete OT evaluation but pt is eating her lunch. Will follow-up when applicable.      Thank you for referral,   Sondra Cade MS, OTR/L

## 2017-03-25 NOTE — ROUTINE PROCESS
Bedside and Verbal shift change report given to Eli Milner RN  (oncoming nurse) by  XUAN Morales RN  (offgoing nurse). Report included the following information SBAR, Kardex, Recent Results and Med Rec Status.

## 2017-03-25 NOTE — PROGRESS NOTES
Hospitalist Progress Note    Patient: Mario Fontanez MRN: [id number]  CSN: [id number]    YOB: 1935  Age: [de-identified] y.o. Sex: female    DOA: 3/24/2017 LOS:  LOS: 1 day              IMPRESSION and Plan:    Mario Fontanez is a [de-identified] y.o. female with   Patient Active Problem List    Diagnosis Date Noted    Hypokalemia 03/24/2017    [facility] 03/24/2017    Chest congestion 03/24/2017    Increased urinary frequency 04/01/2016    Acute exacerbation of COPD with asthma ([facility] 03/28/2016    Tobacco use 03/28/2016    Bronchitis 04/25/2015    Unspecified constipation 08/19/2012    Urinary retention with incomplete bladder emptying 08/16/2012    DM (diabetes mellitus) ([facility] 08/15/2012    HTN (hypertension) 08/15/2012    Esophageal reflux 08/15/2012     Principal Problem:    Acute exacerbation of COPD with asthma ([facility] (3/28/2016)    Active Problems:    DM (diabetes mellitus) ([facility] (8/15/2012)      HTN (hypertension) (8/15/2012)      Esophageal reflux (8/15/2012)      Tobacco use (3/28/2016)      Hypokalemia (3/24/2017)      COPD exacerbation (HCC) (3/24/2017)      Chest congestion (3/24/2017)        1) acute copd exacerbation   Cont current iv solumedrol, nebs and abx  Repeat cxr  Add mucinex and tessalon perles  2) DM-Ii -- accuechecks and ssi  3) HTN - bp/hr improved  4) FEN - labs as ordered and reviewed    Patient's condition is         Recommend to continue hospitalization. Discussed with patient. Chief Complaints:   Chief Complaint   Patient presents with    Shortness of Breath    Cough     SUBJECTIVE:  Pt is seen and examined. Chart reviewed. She is still co coughing and wheezing. No CP   No Fever, chills, Nausea, vomitting. Review of systems:    General: No fevers or chills. Cardiovascular: No chest pain or pressure. No palpitations. Pulmonary: shortness of breath.    Gastrointestinal: No nausea, vomiting    PE:  Patient Vitals for the past 24 hrs:   BP Temp Pulse Resp SpO2 Weight   03/25/17 1129 151/48 97.1 °F (36.2 °C) 62 16 97 % -   03/25/17 0746 196/85 97.3 °F (36.3 °C) 88 16 99 % -   03/25/17 0739 - - - - 99 % -   03/25/17 0340 177/83 97.9 °F (36.6 °C) 94 18 98 % -   03/24/17 2342 156/67 98 °F (36.7 °C) 96 16 98 % 71.6 kg (157 lb 14.4 oz)   03/24/17 2024 169/74 98 °F (36.7 °C) (!) 105 17 100 % -   03/24/17 1618 146/78 98.1 °F (36.7 °C) (!) 109 16 99 % -   03/24/17 1415 (!) 158/99 - - 16 98 % -   03/24/17 1400 153/68 98.5 °F (36.9 °C) (!) 107 15 96 % -   03/24/17 1345 169/76 - (!) 103 11 100 % -   03/24/17 1330 163/75 - (!) 103 12 98 % -   03/24/17 1315 160/78 - (!) 102 17 96 % -   03/24/17 1230 135/63 - (!) 101 11 97 % -       Intake/Output Summary (Last 24 hours) at 03/25/17 1202  Last data filed at 03/25/17 0100   Gross per 24 hour   Intake                0 ml   Output              600 ml   Net             -600 ml     Patient Vitals for the past 120 hrs:   Weight   03/24/17 0907 68 kg (150 lb)   03/24/17 2342 71.6 kg (157 lb 14.4 oz)           HEENT: Perrla, EOMI; oral mucosa well prefused; Conjunctiva not injected  Neck: No JVD, Negative carotid bruits, normal pulses; No thyromegaly  Resp: B/l exp wheezing  CV: RRR s1s2 No murmur; No rubs; PMI not displaced  Abd: Positive Bowel Sounds, Soft, Nontender  Ext: No clubbing; No cyanosis;  No edema  Neuro: Alert and oriented; Nonfocal  Skin: Warm, Dry, Intact  Pulses: 2+ DP/PT/Rad      Intake and Output:  Current Shift:     Last three shifts:  03/23 1901 - 03/25 0700  In: -   Out: 600 [Urine:600]    Lab/Data Reviewed:  Recent Results (from the past 8 hour(s))   GLUCOSE, POC    Collection Time: 03/25/17  7:48 AM   Result Value Ref Range    Glucose (POC) 173 (H) 70 - 110 mg/dL   GLUCOSE, POC    Collection Time: 03/25/17 11:33 AM   Result Value Ref Range    Glucose (POC) 186 (H) 70 - 110 mg/dL     Medications:  Current Facility-Administered Medications   Medication Dose Route Frequency    sodium chloride (NS) flush 5-10 mL  5-10 mL IntraVENous Q8H    sodium chloride (NS) flush 5-10 mL  5-10 mL IntraVENous PRN    methylPREDNISolone (PF) (SOLU-MEDROL) injection 40 mg  40 mg IntraVENous Q6H    HYDROcodone-acetaminophen (NORCO) 5-325 mg per tablet 1 Tab  1 Tab Oral Q4H PRN    clopidogrel (PLAVIX) tablet 75 mg  75 mg Oral DAILY    hydrALAZINE (APRESOLINE) tablet 50 mg  50 mg Oral TID    lisinopril (PRINIVIL, ZESTRIL) tablet 20 mg  20 mg Oral DAILY    NIFEdipine ER (PROCARDIA XL) tablet 90 mg  90 mg Oral DAILY    amitriptyline (ELAVIL) tablet 10 mg  10 mg Oral QHS    aspirin chewable tablet 81 mg  81 mg Oral DAILY    levoFLOXacin (LEVAQUIN) 500 mg in D5W IVPB  500 mg IntraVENous Q24H    ketorolac (TORADOL) injection 15 mg  15 mg IntraVENous Q6H PRN    naloxone (NARCAN) injection 0.4 mg  0.4 mg IntraVENous PRN    diphenhydrAMINE (BENADRYL) injection 12.5 mg  12.5 mg IntraVENous Q4H PRN    ondansetron (ZOFRAN) injection 4 mg  4 mg IntraVENous Q4H PRN    docusate sodium (COLACE) capsule 100 mg  100 mg Oral BID    nicotine (NICODERM CQ) 7 mg/24 hr patch 1 Patch  1 Patch TransDERmal 7am    enoxaparin (LOVENOX) injection 40 mg  40 mg SubCUTAneous Q24H    pantoprazole (PROTONIX) tablet 40 mg  40 mg Oral ACB&D    arformoterol (BROVANA) neb solution 15 mcg  15 mcg Nebulization BID RT    budesonide (PULMICORT) 500 mcg/2 ml nebulizer suspension  500 mcg Nebulization BID RT    insulin glargine (LANTUS) injection 14 Units  0.2 Units/kg SubCUTAneous QHS    insulin lispro (HUMALOG) injection   SubCUTAneous AC&HS    glucose chewable tablet 16 g  4 Tab Oral PRN    glucagon (GLUCAGEN) injection 1 mg  1 mg IntraMUSCular PRN    dextrose (D50W) injection syrg 12.5-25 g  25-50 mL IntraVENous PRN    chlorpheniramine-HYDROcodone (TUSSIONEX) oral suspension 5 mL  5 mL Oral Q12H PRN       Recent Results (from the past 24 hour(s))   EKG, 12 LEAD, SUBSEQUENT    Collection Time: 03/24/17 12:22 PM   Result Value Ref Range    Ventricular Rate 99 BPM Atrial Rate 99 BPM    P-R Interval 178 ms    QRS Duration 82 ms    Q-T Interval 366 ms    QTC Calculation (Bezet) 469 ms    Calculated P Axis 32 degrees    Calculated R Axis -39 degrees    Calculated T Axis 28 degrees    Diagnosis       Normal sinus rhythm  Left axis deviation  Moderate voltage criteria for LVH, may be normal variant  Inferior infarct (cited on or before 28-MAR-2016)  Anterior infarct (cited on or before 28-MAR-2016)  Abnormal ECG  When compared with ECG of 24-MAR-2017 09:19,  premature ventricular complexes are no longer present  Vent.  rate has increased BY  36 BPM  QT has lengthened  Confirmed by Sunil Barton MD. ([id number]) on 3/24/2017 4:37:15 PM     LIPASE    Collection Time: 03/24/17 12:40 PM   Result Value Ref Range    Lipase 100 73 - 393 U/L   CARDIAC PANEL,(CK, CKMB & TROPONIN)    Collection Time: 03/24/17 12:40 PM   Result Value Ref Range    CK 33 26 - 192 U/L    CK - MB <1.0 <3.6 ng/ml    CK-MB Index Cannot be calulated 0.0 - 4.0 %    Troponin-I, Qt. <0.02 0.00 - 0.06 NG/ML   GLUCOSE, POC    Collection Time: 03/24/17  5:10 PM   Result Value Ref Range    Glucose (POC) 221 (H) 70 - 110 mg/dL   CARDIAC PANEL,(CK, CKMB & TROPONIN)    Collection Time: 03/24/17  6:45 PM   Result Value Ref Range    CK 43 26 - 192 U/L    CK - MB <1.0 <3.6 ng/ml    CK-MB Index Cannot be calulated 0.0 - 4.0 %    Troponin-I, Qt. <0.02 0.00 - 0.06 NG/ML   GLUCOSE, POC    Collection Time: 03/24/17  9:35 PM   Result Value Ref Range    Glucose (POC) 199 (H) 70 - 110 mg/dL   HEMOGLOBIN A1C WITH EAG    Collection Time: 03/25/17  3:32 AM   Result Value Ref Range    Hemoglobin A1c 6.7 (H) 4.5 - 5.6 %    Est. average glucose 836 mg/dL   METABOLIC PANEL, BASIC    Collection Time: 03/25/17  3:32 AM   Result Value Ref Range    Sodium 141 136 - 145 mmol/L    Potassium 3.2 (L) 3.5 - 5.5 mmol/L    Chloride 103 100 - 108 mmol/L    CO2 27 21 - 32 mmol/L    Anion gap 11 3.0 - 18 mmol/L    Glucose 188 (H) 74 - 99 mg/dL    BUN 17 7.0 - 18 MG/DL    Creatinine 1.15 0.6 - 1.3 MG/DL    BUN/Creatinine ratio 15 12 - 20      GFR est AA 55 (L) >60 ml/min/1.73m2    GFR est non-AA 45 (L) >60 ml/min/1.73m2    Calcium 9.0 8.5 - 10.1 MG/DL   MAGNESIUM    Collection Time: 03/25/17  3:32 AM   Result Value Ref Range    Magnesium 2.3 1.8 - 2.4 mg/dL   CBC WITH AUTOMATED DIFF    Collection Time: 03/25/17  3:32 AM   Result Value Ref Range    WBC 9.8 4.6 - 13.2 K/uL    RBC 4.49 4.20 - 5.30 M/uL    HGB 13.4 12.0 - 16.0 g/dL    HCT 39.3 35.0 - 45.0 %    MCV 87.5 74.0 - 97.0 FL    MCH 29.8 24.0 - 34.0 PG    MCHC 34.1 31.0 - 37.0 g/dL    RDW 12.9 11.6 - 14.5 %    PLATELET 681 076 - 823 K/uL    MPV 9.0 (L) 9.2 - 11.8 FL    NEUTROPHILS 87 (H) 40 - 73 %    LYMPHOCYTES 12 (L) 21 - 52 %    MONOCYTES 1 (L) 3 - 10 %    EOSINOPHILS 0 0 - 5 %    BASOPHILS 0 0 - 2 %    ABS. NEUTROPHILS 8.5 (H) 1.8 - 8.0 K/UL    ABS. LYMPHOCYTES 1.1 0.9 - 3.6 K/UL    ABS. MONOCYTES 0.1 0.05 - 1.2 K/UL    ABS. EOSINOPHILS 0.0 0.0 - 0.4 K/UL    ABS.  BASOPHILS 0.0 0.0 - 0.06 K/UL    DF AUTOMATED     GLUCOSE, POC    Collection Time: 03/25/17  7:48 AM   Result Value Ref Range    Glucose (POC) 173 (H) 70 - 110 mg/dL   GLUCOSE, POC    Collection Time: 03/25/17 11:33 AM   Result Value Ref Range    Glucose (POC) 186 (H) 70 - 110 mg/dL       Procedures/imaging: see electronic medical records for all procedures/Xrays and details which were not copied into this note but were reviewed prior to creation of Arnulfo Crespo MD   3/25/2017, 12:02 PM

## 2017-03-26 ENCOUNTER — APPOINTMENT (OUTPATIENT)
Dept: GENERAL RADIOLOGY | Age: 82
DRG: 192 | End: 2017-03-26
Attending: INTERNAL MEDICINE
Payer: MEDICARE

## 2017-03-26 LAB
ALBUMIN SERPL BCP-MCNC: 2.7 G/DL (ref 3.4–5)
ALBUMIN/GLOB SERPL: 0.8 {RATIO} (ref 0.8–1.7)
ALP SERPL-CCNC: 85 U/L (ref 45–117)
ALT SERPL-CCNC: 16 U/L (ref 13–56)
ANION GAP BLD CALC-SCNC: 9 MMOL/L (ref 3–18)
AST SERPL W P-5'-P-CCNC: 11 U/L (ref 15–37)
BASOPHILS # BLD AUTO: 0 K/UL (ref 0–0.06)
BASOPHILS # BLD: 0 % (ref 0–2)
BILIRUB SERPL-MCNC: 0.1 MG/DL (ref 0.2–1)
BUN SERPL-MCNC: 29 MG/DL (ref 7–18)
BUN/CREAT SERPL: 22 (ref 12–20)
CALCIUM SERPL-MCNC: 8.5 MG/DL (ref 8.5–10.1)
CHLORIDE SERPL-SCNC: 107 MMOL/L (ref 100–108)
CO2 SERPL-SCNC: 27 MMOL/L (ref 21–32)
CREAT SERPL-MCNC: 1.34 MG/DL (ref 0.6–1.3)
DIFFERENTIAL METHOD BLD: ABNORMAL
EOSINOPHIL # BLD: 0 K/UL (ref 0–0.4)
EOSINOPHIL NFR BLD: 0 % (ref 0–5)
ERYTHROCYTE [DISTWIDTH] IN BLOOD BY AUTOMATED COUNT: 13 % (ref 11.6–14.5)
GLOBULIN SER CALC-MCNC: 3.2 G/DL (ref 2–4)
GLUCOSE BLD STRIP.AUTO-MCNC: 148 MG/DL (ref 70–110)
GLUCOSE BLD STRIP.AUTO-MCNC: 175 MG/DL (ref 70–110)
GLUCOSE BLD STRIP.AUTO-MCNC: 177 MG/DL (ref 70–110)
GLUCOSE BLD STRIP.AUTO-MCNC: 203 MG/DL (ref 70–110)
GLUCOSE SERPL-MCNC: 212 MG/DL (ref 74–99)
HCT VFR BLD AUTO: 38.7 % (ref 35–45)
HGB BLD-MCNC: 12.7 G/DL (ref 12–16)
LYMPHOCYTES # BLD AUTO: 8 % (ref 21–52)
LYMPHOCYTES # BLD: 1.4 K/UL (ref 0.9–3.6)
MAGNESIUM SERPL-MCNC: 2.2 MG/DL (ref 1.8–2.4)
MCH RBC QN AUTO: 29.2 PG (ref 24–34)
MCHC RBC AUTO-ENTMCNC: 32.8 G/DL (ref 31–37)
MCV RBC AUTO: 89 FL (ref 74–97)
MONOCYTES # BLD: 0.4 K/UL (ref 0.05–1.2)
MONOCYTES NFR BLD AUTO: 3 % (ref 3–10)
NEUTS SEG # BLD: 15.7 K/UL (ref 1.8–8)
NEUTS SEG NFR BLD AUTO: 89 % (ref 40–73)
PHOSPHATE SERPL-MCNC: 3.5 MG/DL (ref 2.5–4.9)
PLATELET # BLD AUTO: 317 K/UL (ref 135–420)
PMV BLD AUTO: 9.3 FL (ref 9.2–11.8)
POTASSIUM SERPL-SCNC: 3.9 MMOL/L (ref 3.5–5.5)
PROT SERPL-MCNC: 5.9 G/DL (ref 6.4–8.2)
RBC # BLD AUTO: 4.35 M/UL (ref 4.2–5.3)
SODIUM SERPL-SCNC: 143 MMOL/L (ref 136–145)
WBC # BLD AUTO: 17.5 K/UL (ref 4.6–13.2)

## 2017-03-26 PROCEDURE — 84100 ASSAY OF PHOSPHORUS: CPT | Performed by: INTERNAL MEDICINE

## 2017-03-26 PROCEDURE — 36415 COLL VENOUS BLD VENIPUNCTURE: CPT | Performed by: INTERNAL MEDICINE

## 2017-03-26 PROCEDURE — 74011250637 HC RX REV CODE- 250/637: Performed by: FAMILY MEDICINE

## 2017-03-26 PROCEDURE — 85025 COMPLETE CBC W/AUTO DIFF WBC: CPT | Performed by: INTERNAL MEDICINE

## 2017-03-26 PROCEDURE — 94760 N-INVAS EAR/PLS OXIMETRY 1: CPT

## 2017-03-26 PROCEDURE — 65270000029 HC RM PRIVATE

## 2017-03-26 PROCEDURE — 94640 AIRWAY INHALATION TREATMENT: CPT

## 2017-03-26 PROCEDURE — 74011000250 HC RX REV CODE- 250: Performed by: HOSPITALIST

## 2017-03-26 PROCEDURE — 71020 XR CHEST PA LAT: CPT

## 2017-03-26 PROCEDURE — 74011250636 HC RX REV CODE- 250/636: Performed by: INTERNAL MEDICINE

## 2017-03-26 PROCEDURE — 82962 GLUCOSE BLOOD TEST: CPT

## 2017-03-26 PROCEDURE — 80053 COMPREHEN METABOLIC PANEL: CPT | Performed by: INTERNAL MEDICINE

## 2017-03-26 PROCEDURE — 74011250637 HC RX REV CODE- 250/637: Performed by: HOSPITALIST

## 2017-03-26 PROCEDURE — 74011250636 HC RX REV CODE- 250/636: Performed by: HOSPITALIST

## 2017-03-26 PROCEDURE — 74011250637 HC RX REV CODE- 250/637: Performed by: INTERNAL MEDICINE

## 2017-03-26 PROCEDURE — 83735 ASSAY OF MAGNESIUM: CPT | Performed by: INTERNAL MEDICINE

## 2017-03-26 RX ADMIN — Medication 10 ML: at 06:21

## 2017-03-26 RX ADMIN — ONDANSETRON 4 MG: 2 INJECTION INTRAMUSCULAR; INTRAVENOUS at 08:45

## 2017-03-26 RX ADMIN — BUDESONIDE 500 MCG: 0.5 INHALANT RESPIRATORY (INHALATION) at 07:31

## 2017-03-26 RX ADMIN — DIAZEPAM 5 MG: 5 TABLET ORAL at 12:57

## 2017-03-26 RX ADMIN — HYDROCODONE BITARTRATE AND ACETAMINOPHEN 1 TABLET: 5; 325 TABLET ORAL at 03:58

## 2017-03-26 RX ADMIN — DOCUSATE SODIUM 100 MG: 100 CAPSULE, LIQUID FILLED ORAL at 21:33

## 2017-03-26 RX ADMIN — HYDROCODONE POLISTIREX AND CHLORPHENIRAMINE POLISTIREX 5 ML: 10; 8 SUSPENSION, EXTENDED RELEASE ORAL at 12:25

## 2017-03-26 RX ADMIN — ASPIRIN 81 MG 81 MG: 81 TABLET ORAL at 08:23

## 2017-03-26 RX ADMIN — CYCLOBENZAPRINE HYDROCHLORIDE 5 MG: 10 TABLET, FILM COATED ORAL at 08:39

## 2017-03-26 RX ADMIN — CLOPIDOGREL BISULFATE 75 MG: 75 TABLET, FILM COATED ORAL at 08:24

## 2017-03-26 RX ADMIN — INSULIN LISPRO 4 UNITS: 100 INJECTION, SOLUTION INTRAVENOUS; SUBCUTANEOUS at 12:11

## 2017-03-26 RX ADMIN — GUAIFENESIN 600 MG: 600 TABLET, EXTENDED RELEASE ORAL at 21:34

## 2017-03-26 RX ADMIN — BENZONATATE 100 MG: 100 CAPSULE ORAL at 17:59

## 2017-03-26 RX ADMIN — LISINOPRIL 20 MG: 20 TABLET ORAL at 08:21

## 2017-03-26 RX ADMIN — ENOXAPARIN SODIUM 40 MG: 40 INJECTION SUBCUTANEOUS at 17:38

## 2017-03-26 RX ADMIN — INSULIN LISPRO 2 UNITS: 100 INJECTION, SOLUTION INTRAVENOUS; SUBCUTANEOUS at 21:34

## 2017-03-26 RX ADMIN — CARBIDOPA AND LEVODOPA 10 MG: 25; 100 TABLET, EXTENDED RELEASE ORAL at 08:23

## 2017-03-26 RX ADMIN — INSULIN GLARGINE 14 UNITS: 100 INJECTION, SOLUTION SUBCUTANEOUS at 21:34

## 2017-03-26 RX ADMIN — ONDANSETRON 4 MG: 2 INJECTION INTRAMUSCULAR; INTRAVENOUS at 18:15

## 2017-03-26 RX ADMIN — INSULIN LISPRO 2 UNITS: 100 INJECTION, SOLUTION INTRAVENOUS; SUBCUTANEOUS at 08:19

## 2017-03-26 RX ADMIN — CARBIDOPA AND LEVODOPA 10 MG: 25; 100 TABLET, EXTENDED RELEASE ORAL at 21:34

## 2017-03-26 RX ADMIN — BUDESONIDE 500 MCG: 0.5 INHALANT RESPIRATORY (INHALATION) at 20:29

## 2017-03-26 RX ADMIN — NIFEDIPINE 90 MG: 30 TABLET, FILM COATED, EXTENDED RELEASE ORAL at 08:22

## 2017-03-26 RX ADMIN — HYDRALAZINE HYDROCHLORIDE 50 MG: 25 TABLET, FILM COATED ORAL at 17:37

## 2017-03-26 RX ADMIN — METHYLPREDNISOLONE SODIUM SUCCINATE 40 MG: 40 INJECTION, POWDER, FOR SOLUTION INTRAMUSCULAR; INTRAVENOUS at 21:34

## 2017-03-26 RX ADMIN — HYDRALAZINE HYDROCHLORIDE 50 MG: 25 TABLET, FILM COATED ORAL at 08:21

## 2017-03-26 RX ADMIN — ARFORMOTEROL TARTRATE 15 MCG: 15 SOLUTION RESPIRATORY (INHALATION) at 07:31

## 2017-03-26 RX ADMIN — PANTOPRAZOLE SODIUM 40 MG: 40 TABLET, DELAYED RELEASE ORAL at 06:20

## 2017-03-26 RX ADMIN — CARBIDOPA AND LEVODOPA 10 MG: 25; 100 TABLET, EXTENDED RELEASE ORAL at 17:37

## 2017-03-26 RX ADMIN — METHYLPREDNISOLONE SODIUM SUCCINATE 40 MG: 40 INJECTION, POWDER, FOR SOLUTION INTRAMUSCULAR; INTRAVENOUS at 06:20

## 2017-03-26 RX ADMIN — AMITRIPTYLINE HYDROCHLORIDE 10 MG: 10 TABLET, FILM COATED ORAL at 21:34

## 2017-03-26 RX ADMIN — GUAIFENESIN 600 MG: 600 TABLET, EXTENDED RELEASE ORAL at 08:21

## 2017-03-26 RX ADMIN — PANTOPRAZOLE SODIUM 40 MG: 40 TABLET, DELAYED RELEASE ORAL at 17:38

## 2017-03-26 RX ADMIN — LEVOFLOXACIN 500 MG: 5 INJECTION, SOLUTION INTRAVENOUS at 18:15

## 2017-03-26 RX ADMIN — PANTOPRAZOLE SODIUM 40 MG: 40 TABLET, DELAYED RELEASE ORAL at 07:30

## 2017-03-26 RX ADMIN — ARFORMOTEROL TARTRATE 15 MCG: 15 SOLUTION RESPIRATORY (INHALATION) at 20:29

## 2017-03-26 RX ADMIN — HYDRALAZINE HYDROCHLORIDE 50 MG: 25 TABLET, FILM COATED ORAL at 21:33

## 2017-03-26 RX ADMIN — Medication 10 ML: at 21:35

## 2017-03-26 NOTE — PROGRESS NOTES
Hospitalist Progress Note    Patient: Delmy Hernández MRN: [id number]  CSN: [id number]    YOB: 1935  Age: [de-identified] y.o. Sex: female    DOA: 3/24/2017 LOS:  LOS: 2 days              IMPRESSION and Plan:    Delmy Hernández is a [de-identified] y.o. female with   Patient Active Problem List    Diagnosis Date Noted    Hypokalemia 03/24/2017    [facility] 03/24/2017    Chest congestion 03/24/2017    Increased urinary frequency 04/01/2016    Acute exacerbation of COPD with asthma ([facility] 03/28/2016    Tobacco use 03/28/2016    Bronchitis 04/25/2015    Unspecified constipation 08/19/2012    Urinary retention with incomplete bladder emptying 08/16/2012    DM (diabetes mellitus) ([facility] 08/15/2012    HTN (hypertension) 08/15/2012    Esophageal reflux 08/15/2012     Principal Problem:    Acute exacerbation of COPD with asthma ([facility] (3/28/2016)    Active Problems:    DM (diabetes mellitus) ([facility] (8/15/2012)      HTN (hypertension) (8/15/2012)      Esophageal reflux (8/15/2012)      Tobacco use (3/28/2016)      Hypokalemia (3/24/2017)      COPD exacerbation (HCC) (3/24/2017)      Chest congestion (3/24/2017)        1) acute copd exacerbation   Decrease  iv solumedrol,   Cont nebs and abx  Repeat cxr today  Cont mucinex and tessalon  Elevated wbc moslty due to steroids. 2) DM-Ii -- accuechecks and ssi  3) HTN - bp/hr improved  4) FEN - labs as ordered and reviewed  5) Dispo -- OOb today  Patient's condition is improving        Recommend to continue hospitalization. Discussed with patient. Chief Complaints:   Chief Complaint   Patient presents with    Shortness of Breath    Cough     SUBJECTIVE:  Pt is seen and examined. \" I can breath better today but still significant coughing\"  No CP   No Fever, chills, Nausea, vomitting. Review of systems:    General: No fevers or chills. Cardiovascular: No chest pain or pressure. No palpitations. Pulmonary: shortness of breath.    Gastrointestinal: No nausea, vomiting    PE:  Patient Vitals for the past 24 hrs:   BP Temp Pulse Resp SpO2 Weight   03/26/17 0711 165/51 97.6 °F (36.4 °C) 81 18 97 % -   03/26/17 0405 167/55 98 °F (36.7 °C) 85 16 98 % -   03/25/17 2340 127/57 98.1 °F (36.7 °C) 87 16 99 % 70.7 kg (155 lb 14.4 oz)   03/25/17 2040 - - - - 97 % -   03/25/17 2022 126/52 98 °F (36.7 °C) 65 16 99 % -   03/25/17 1425 150/80 98.6 °F (37 °C) (!) 101 16 100 % -   03/25/17 1129 151/48 97.1 °F (36.2 °C) 62 16 97 % -       Intake/Output Summary (Last 24 hours) at 03/26/17 0936  Last data filed at 03/26/17 0805   Gross per 24 hour   Intake              660 ml   Output              100 ml   Net              560 ml     Patient Vitals for the past 120 hrs:   Weight   03/24/17 0907 68 kg (150 lb)   03/24/17 2342 71.6 kg (157 lb 14.4 oz)   03/25/17 2340 70.7 kg (155 lb 14.4 oz)           HEENT: Perrla, EOMI; oral mucosa well prefused; Conjunctiva not injected  Neck: No JVD, Negative carotid bruits, normal pulses; No thyromegaly  Resp: B/l exp wheezing but improving. CV: RRR s1s2 No murmur; No rubs; PMI not displaced  Abd: Positive Bowel Sounds, Soft, Nontender  Ext: No clubbing; No cyanosis;  No edema  Neuro: Alert and oriented; Nonfocal  Skin: Warm, Dry, Intact  Pulses: 2+ DP/PT/Rad      Intake and Output:  Current Shift:  03/26 0701 - 03/26 1900  In: 100 [P.O.:100]  Out: -   Last three shifts:  03/24 1901 - 03/26 0700  In: 560 [P.O.:360; I.V.:200]  Out: 700 [Urine:700]    Lab/Data Reviewed:  Recent Results (from the past 8 hour(s))   MAGNESIUM    Collection Time: 03/26/17  5:20 AM   Result Value Ref Range    Magnesium 2.2 1.8 - 2.4 mg/dL   CBC WITH AUTOMATED DIFF    Collection Time: 03/26/17  5:20 AM   Result Value Ref Range    WBC 17.5 (H) 4.6 - 13.2 K/uL    RBC 4.35 4.20 - 5.30 M/uL    HGB 12.7 12.0 - 16.0 g/dL    HCT 38.7 35.0 - 45.0 %    MCV 89.0 74.0 - 97.0 FL    MCH 29.2 24.0 - 34.0 PG    MCHC 32.8 31.0 - 37.0 g/dL    RDW 13.0 11.6 - 14.5 %    PLATELET 618 926 - 420 K/uL    MPV 9.3 9.2 - 11.8 FL    NEUTROPHILS 89 (H) 40 - 73 %    LYMPHOCYTES 8 (L) 21 - 52 %    MONOCYTES 3 3 - 10 %    EOSINOPHILS 0 0 - 5 %    BASOPHILS 0 0 - 2 %    ABS. NEUTROPHILS 15.7 (H) 1.8 - 8.0 K/UL    ABS. LYMPHOCYTES 1.4 0.9 - 3.6 K/UL    ABS. MONOCYTES 0.4 0.05 - 1.2 K/UL    ABS. EOSINOPHILS 0.0 0.0 - 0.4 K/UL    ABS. BASOPHILS 0.0 0.0 - 0.06 K/UL    DF AUTOMATED     METABOLIC PANEL, COMPREHENSIVE    Collection Time: 03/26/17  5:20 AM   Result Value Ref Range    Sodium 143 136 - 145 mmol/L    Potassium 3.9 3.5 - 5.5 mmol/L    Chloride 107 100 - 108 mmol/L    CO2 27 21 - 32 mmol/L    Anion gap 9 3.0 - 18 mmol/L    Glucose 212 (H) 74 - 99 mg/dL    BUN 29 (H) 7.0 - 18 MG/DL    Creatinine 1.34 (H) 0.6 - 1.3 MG/DL    BUN/Creatinine ratio 22 (H) 12 - 20      GFR est AA 46 (L) >60 ml/min/1.73m2    GFR est non-AA 38 (L) >60 ml/min/1.73m2    Calcium 8.5 8.5 - 10.1 MG/DL    Bilirubin, total 0.1 (L) 0.2 - 1.0 MG/DL    ALT (SGPT) 16 13 - 56 U/L    AST (SGOT) 11 (L) 15 - 37 U/L    Alk.  phosphatase 85 45 - 117 U/L    Protein, total 5.9 (L) 6.4 - 8.2 g/dL    Albumin 2.7 (L) 3.4 - 5.0 g/dL    Globulin 3.2 2.0 - 4.0 g/dL    A-G Ratio 0.8 0.8 - 1.7     PHOSPHORUS    Collection Time: 03/26/17  5:20 AM   Result Value Ref Range    Phosphorus 3.5 2.5 - 4.9 MG/DL   GLUCOSE, POC    Collection Time: 03/26/17  7:14 AM   Result Value Ref Range    Glucose (POC) 194 (H) 70 - 110 mg/dL   GLUCOSE, POC    Collection Time: 03/26/17  7:18 AM   Result Value Ref Range    Glucose (POC) 175 (H) 70 - 110 mg/dL     Medications:  Current Facility-Administered Medications   Medication Dose Route Frequency    guaiFENesin ER (MUCINEX) tablet 600 mg  600 mg Oral Q12H    benzonatate (TESSALON) capsule 100 mg  100 mg Oral TID PRN    zolpidem (AMBIEN) tablet 5 mg  5 mg Oral QHS PRN    polyethylene glycol (MIRALAX) packet 17 g  17 g Oral DAILY PRN    busPIRone (BUSPAR) tablet 10 mg  10 mg Oral TID    diazePAM (VALIUM) tablet 5 mg  5 mg Oral Q8H PRN    cyclobenzaprine (FLEXERIL) tablet 5 mg  5 mg Oral TID PRN    sodium chloride (NS) flush 5-10 mL  5-10 mL IntraVENous Q8H    sodium chloride (NS) flush 5-10 mL  5-10 mL IntraVENous PRN    methylPREDNISolone (PF) (SOLU-MEDROL) injection 40 mg  40 mg IntraVENous Q6H    HYDROcodone-acetaminophen (NORCO) 5-325 mg per tablet 1 Tab  1 Tab Oral Q4H PRN    clopidogrel (PLAVIX) tablet 75 mg  75 mg Oral DAILY    hydrALAZINE (APRESOLINE) tablet 50 mg  50 mg Oral TID    lisinopril (PRINIVIL, ZESTRIL) tablet 20 mg  20 mg Oral DAILY    NIFEdipine ER (PROCARDIA XL) tablet 90 mg  90 mg Oral DAILY    amitriptyline (ELAVIL) tablet 10 mg  10 mg Oral QHS    aspirin chewable tablet 81 mg  81 mg Oral DAILY    levoFLOXacin (LEVAQUIN) 500 mg in D5W IVPB  500 mg IntraVENous Q24H    ketorolac (TORADOL) injection 15 mg  15 mg IntraVENous Q6H PRN    naloxone (NARCAN) injection 0.4 mg  0.4 mg IntraVENous PRN    diphenhydrAMINE (BENADRYL) injection 12.5 mg  12.5 mg IntraVENous Q4H PRN    ondansetron (ZOFRAN) injection 4 mg  4 mg IntraVENous Q4H PRN    docusate sodium (COLACE) capsule 100 mg  100 mg Oral BID    nicotine (NICODERM CQ) 7 mg/24 hr patch 1 Patch  1 Patch TransDERmal 7am    enoxaparin (LOVENOX) injection 40 mg  40 mg SubCUTAneous Q24H    pantoprazole (PROTONIX) tablet 40 mg  40 mg Oral ACB&D    arformoterol (BROVANA) neb solution 15 mcg  15 mcg Nebulization BID RT    budesonide (PULMICORT) 500 mcg/2 ml nebulizer suspension  500 mcg Nebulization BID RT    insulin glargine (LANTUS) injection 14 Units  0.2 Units/kg SubCUTAneous QHS    insulin lispro (HUMALOG) injection   SubCUTAneous AC&HS    glucose chewable tablet 16 g  4 Tab Oral PRN    glucagon (GLUCAGEN) injection 1 mg  1 mg IntraMUSCular PRN    dextrose (D50W) injection syrg 12.5-25 g  25-50 mL IntraVENous PRN    chlorpheniramine-HYDROcodone (TUSSIONEX) oral suspension 5 mL  5 mL Oral Q12H PRN       Recent Results (from the past 24 hour(s)) GLUCOSE, POC    Collection Time: 03/25/17 11:33 AM   Result Value Ref Range    Glucose (POC) 186 (H) 70 - 110 mg/dL   GLUCOSE, POC    Collection Time: 03/25/17  4:30 PM   Result Value Ref Range    Glucose (POC) 207 (H) 70 - 110 mg/dL   GLUCOSE, POC    Collection Time: 03/25/17  9:16 PM   Result Value Ref Range    Glucose (POC) 200 (H) 70 - 110 mg/dL   MAGNESIUM    Collection Time: 03/26/17  5:20 AM   Result Value Ref Range    Magnesium 2.2 1.8 - 2.4 mg/dL   CBC WITH AUTOMATED DIFF    Collection Time: 03/26/17  5:20 AM   Result Value Ref Range    WBC 17.5 (H) 4.6 - 13.2 K/uL    RBC 4.35 4.20 - 5.30 M/uL    HGB 12.7 12.0 - 16.0 g/dL    HCT 38.7 35.0 - 45.0 %    MCV 89.0 74.0 - 97.0 FL    MCH 29.2 24.0 - 34.0 PG    MCHC 32.8 31.0 - 37.0 g/dL    RDW 13.0 11.6 - 14.5 %    PLATELET 967 906 - 713 K/uL    MPV 9.3 9.2 - 11.8 FL    NEUTROPHILS 89 (H) 40 - 73 %    LYMPHOCYTES 8 (L) 21 - 52 %    MONOCYTES 3 3 - 10 %    EOSINOPHILS 0 0 - 5 %    BASOPHILS 0 0 - 2 %    ABS. NEUTROPHILS 15.7 (H) 1.8 - 8.0 K/UL    ABS. LYMPHOCYTES 1.4 0.9 - 3.6 K/UL    ABS. MONOCYTES 0.4 0.05 - 1.2 K/UL    ABS. EOSINOPHILS 0.0 0.0 - 0.4 K/UL    ABS. BASOPHILS 0.0 0.0 - 0.06 K/UL    DF AUTOMATED     METABOLIC PANEL, COMPREHENSIVE    Collection Time: 03/26/17  5:20 AM   Result Value Ref Range    Sodium 143 136 - 145 mmol/L    Potassium 3.9 3.5 - 5.5 mmol/L    Chloride 107 100 - 108 mmol/L    CO2 27 21 - 32 mmol/L    Anion gap 9 3.0 - 18 mmol/L    Glucose 212 (H) 74 - 99 mg/dL    BUN 29 (H) 7.0 - 18 MG/DL    Creatinine 1.34 (H) 0.6 - 1.3 MG/DL    BUN/Creatinine ratio 22 (H) 12 - 20      GFR est AA 46 (L) >60 ml/min/1.73m2    GFR est non-AA 38 (L) >60 ml/min/1.73m2    Calcium 8.5 8.5 - 10.1 MG/DL    Bilirubin, total 0.1 (L) 0.2 - 1.0 MG/DL    ALT (SGPT) 16 13 - 56 U/L    AST (SGOT) 11 (L) 15 - 37 U/L    Alk.  phosphatase 85 45 - 117 U/L    Protein, total 5.9 (L) 6.4 - 8.2 g/dL    Albumin 2.7 (L) 3.4 - 5.0 g/dL    Globulin 3.2 2.0 - 4.0 g/dL    A-G Ratio 0.8 0.8 - 1.7     PHOSPHORUS    Collection Time: 03/26/17  5:20 AM   Result Value Ref Range    Phosphorus 3.5 2.5 - 4.9 MG/DL   GLUCOSE, POC    Collection Time: 03/26/17  7:14 AM   Result Value Ref Range    Glucose (POC) 194 (H) 70 - 110 mg/dL   GLUCOSE, POC    Collection Time: 03/26/17  7:18 AM   Result Value Ref Range    Glucose (POC) 175 (H) 70 - 110 mg/dL       Procedures/imaging: see electronic medical records for all procedures/Xrays and details which were not copied into this note but were reviewed prior to creation of Rome Gutierrez MD   3/26/2017, 12:02 PM

## 2017-03-26 NOTE — ROUTINE PROCESS
Bedside and Verbal shift change report given to Nicole Shetty RN (oncoming nurse) by Ken Ta   (offgoing nurse). Report included the following information SBAR, Kardex, Procedure Summary, Intake/Output, MAR, Recent Results and Med Rec Status.

## 2017-03-26 NOTE — PROGRESS NOTES
Shift summary:  Assumed care of patient in bed awake no s/s of acute distress. , Call bell with in reach. Uneventful night.

## 2017-03-26 NOTE — ROUTINE PROCESS
Bedside and Verbal shift change report given to [facility] RN(oncoming nurse) by Trent Rogers RN   (offgoing nurse). Report included the following information SBAR, Kardex, MAR and Recent Results.

## 2017-03-27 LAB
ALBUMIN SERPL BCP-MCNC: 2.7 G/DL (ref 3.4–5)
ALBUMIN/GLOB SERPL: 0.9 {RATIO} (ref 0.8–1.7)
ALP SERPL-CCNC: 82 U/L (ref 45–117)
ALT SERPL-CCNC: 16 U/L (ref 13–56)
ANION GAP BLD CALC-SCNC: 7 MMOL/L (ref 3–18)
AST SERPL W P-5'-P-CCNC: 10 U/L (ref 15–37)
BASOPHILS # BLD AUTO: 0 K/UL (ref 0–0.06)
BASOPHILS # BLD: 0 % (ref 0–2)
BILIRUB SERPL-MCNC: 0.1 MG/DL (ref 0.2–1)
BUN SERPL-MCNC: 31 MG/DL (ref 7–18)
BUN/CREAT SERPL: 28 (ref 12–20)
CALCIUM SERPL-MCNC: 8.2 MG/DL (ref 8.5–10.1)
CHLORIDE SERPL-SCNC: 105 MMOL/L (ref 100–108)
CO2 SERPL-SCNC: 29 MMOL/L (ref 21–32)
CREAT SERPL-MCNC: 1.09 MG/DL (ref 0.6–1.3)
DIFFERENTIAL METHOD BLD: ABNORMAL
EOSINOPHIL # BLD: 0 K/UL (ref 0–0.4)
EOSINOPHIL NFR BLD: 0 % (ref 0–5)
ERYTHROCYTE [DISTWIDTH] IN BLOOD BY AUTOMATED COUNT: 13 % (ref 11.6–14.5)
GLOBULIN SER CALC-MCNC: 3.1 G/DL (ref 2–4)
GLUCOSE BLD STRIP.AUTO-MCNC: 133 MG/DL (ref 70–110)
GLUCOSE BLD STRIP.AUTO-MCNC: 166 MG/DL (ref 70–110)
GLUCOSE BLD STRIP.AUTO-MCNC: 191 MG/DL (ref 70–110)
GLUCOSE BLD STRIP.AUTO-MCNC: 194 MG/DL (ref 70–110)
GLUCOSE BLD STRIP.AUTO-MCNC: 201 MG/DL (ref 70–110)
GLUCOSE SERPL-MCNC: 175 MG/DL (ref 74–99)
HCT VFR BLD AUTO: 39.9 % (ref 35–45)
HGB BLD-MCNC: 13.4 G/DL (ref 12–16)
LYMPHOCYTES # BLD AUTO: 18 % (ref 21–52)
LYMPHOCYTES # BLD: 2.4 K/UL (ref 0.9–3.6)
MAGNESIUM SERPL-MCNC: 2.2 MG/DL (ref 1.8–2.4)
MCH RBC QN AUTO: 29.8 PG (ref 24–34)
MCHC RBC AUTO-ENTMCNC: 33.6 G/DL (ref 31–37)
MCV RBC AUTO: 88.7 FL (ref 74–97)
MONOCYTES # BLD: 0.5 K/UL (ref 0.05–1.2)
MONOCYTES NFR BLD AUTO: 4 % (ref 3–10)
NEUTS SEG # BLD: 10.3 K/UL (ref 1.8–8)
NEUTS SEG NFR BLD AUTO: 78 % (ref 40–73)
PHOSPHATE SERPL-MCNC: 4.2 MG/DL (ref 2.5–4.9)
PLATELET # BLD AUTO: 295 K/UL (ref 135–420)
PMV BLD AUTO: 9.2 FL (ref 9.2–11.8)
POTASSIUM SERPL-SCNC: 3.5 MMOL/L (ref 3.5–5.5)
PROT SERPL-MCNC: 5.8 G/DL (ref 6.4–8.2)
RBC # BLD AUTO: 4.5 M/UL (ref 4.2–5.3)
SODIUM SERPL-SCNC: 141 MMOL/L (ref 136–145)
WBC # BLD AUTO: 13.2 K/UL (ref 4.6–13.2)

## 2017-03-27 PROCEDURE — 74011000250 HC RX REV CODE- 250: Performed by: HOSPITALIST

## 2017-03-27 PROCEDURE — 85025 COMPLETE CBC W/AUTO DIFF WBC: CPT | Performed by: INTERNAL MEDICINE

## 2017-03-27 PROCEDURE — 94760 N-INVAS EAR/PLS OXIMETRY 1: CPT

## 2017-03-27 PROCEDURE — 80053 COMPREHEN METABOLIC PANEL: CPT | Performed by: INTERNAL MEDICINE

## 2017-03-27 PROCEDURE — 84100 ASSAY OF PHOSPHORUS: CPT | Performed by: INTERNAL MEDICINE

## 2017-03-27 PROCEDURE — 74011250637 HC RX REV CODE- 250/637: Performed by: INTERNAL MEDICINE

## 2017-03-27 PROCEDURE — 74011250637 HC RX REV CODE- 250/637: Performed by: FAMILY MEDICINE

## 2017-03-27 PROCEDURE — 65270000029 HC RM PRIVATE

## 2017-03-27 PROCEDURE — 36415 COLL VENOUS BLD VENIPUNCTURE: CPT | Performed by: INTERNAL MEDICINE

## 2017-03-27 PROCEDURE — 74011250637 HC RX REV CODE- 250/637: Performed by: HOSPITALIST

## 2017-03-27 PROCEDURE — 94640 AIRWAY INHALATION TREATMENT: CPT

## 2017-03-27 PROCEDURE — 74011250636 HC RX REV CODE- 250/636: Performed by: HOSPITALIST

## 2017-03-27 PROCEDURE — 82962 GLUCOSE BLOOD TEST: CPT

## 2017-03-27 PROCEDURE — 83735 ASSAY OF MAGNESIUM: CPT | Performed by: INTERNAL MEDICINE

## 2017-03-27 PROCEDURE — 74011250636 HC RX REV CODE- 250/636: Performed by: INTERNAL MEDICINE

## 2017-03-27 RX ORDER — ONDANSETRON 4 MG/1
4 TABLET, FILM COATED ORAL
Status: DISCONTINUED | OUTPATIENT
Start: 2017-03-27 | End: 2017-03-31 | Stop reason: HOSPADM

## 2017-03-27 RX ORDER — INSULIN LISPRO 100 [IU]/ML
3 INJECTION, SOLUTION INTRAVENOUS; SUBCUTANEOUS
Status: DISCONTINUED | OUTPATIENT
Start: 2017-03-27 | End: 2017-03-28

## 2017-03-27 RX ORDER — IPRATROPIUM BROMIDE AND ALBUTEROL SULFATE 2.5; .5 MG/3ML; MG/3ML
3 SOLUTION RESPIRATORY (INHALATION)
Status: DISCONTINUED | OUTPATIENT
Start: 2017-03-27 | End: 2017-03-31 | Stop reason: HOSPADM

## 2017-03-27 RX ADMIN — Medication 10 ML: at 06:00

## 2017-03-27 RX ADMIN — INSULIN GLARGINE 14 UNITS: 100 INJECTION, SOLUTION SUBCUTANEOUS at 21:56

## 2017-03-27 RX ADMIN — CARBIDOPA AND LEVODOPA 10 MG: 25; 100 TABLET, EXTENDED RELEASE ORAL at 16:33

## 2017-03-27 RX ADMIN — ARFORMOTEROL TARTRATE 15 MCG: 15 SOLUTION RESPIRATORY (INHALATION) at 20:56

## 2017-03-27 RX ADMIN — INSULIN LISPRO 2 UNITS: 100 INJECTION, SOLUTION INTRAVENOUS; SUBCUTANEOUS at 17:09

## 2017-03-27 RX ADMIN — Medication 10 ML: at 17:10

## 2017-03-27 RX ADMIN — HYDRALAZINE HYDROCHLORIDE 50 MG: 25 TABLET, FILM COATED ORAL at 22:02

## 2017-03-27 RX ADMIN — NIFEDIPINE 90 MG: 30 TABLET, FILM COATED, EXTENDED RELEASE ORAL at 10:51

## 2017-03-27 RX ADMIN — DOCUSATE SODIUM 100 MG: 100 CAPSULE, LIQUID FILLED ORAL at 20:26

## 2017-03-27 RX ADMIN — DOCUSATE SODIUM 100 MG: 100 CAPSULE, LIQUID FILLED ORAL at 10:52

## 2017-03-27 RX ADMIN — CARBIDOPA AND LEVODOPA 10 MG: 25; 100 TABLET, EXTENDED RELEASE ORAL at 10:51

## 2017-03-27 RX ADMIN — INSULIN LISPRO 4 UNITS: 100 INJECTION, SOLUTION INTRAVENOUS; SUBCUTANEOUS at 12:47

## 2017-03-27 RX ADMIN — HYDRALAZINE HYDROCHLORIDE 50 MG: 25 TABLET, FILM COATED ORAL at 16:33

## 2017-03-27 RX ADMIN — HYDROCODONE POLISTIREX AND CHLORPHENIRAMINE POLISTIREX 5 ML: 10; 8 SUSPENSION, EXTENDED RELEASE ORAL at 04:59

## 2017-03-27 RX ADMIN — CLOPIDOGREL BISULFATE 75 MG: 75 TABLET, FILM COATED ORAL at 10:52

## 2017-03-27 RX ADMIN — ASPIRIN 81 MG 81 MG: 81 TABLET ORAL at 10:52

## 2017-03-27 RX ADMIN — HYDRALAZINE HYDROCHLORIDE 50 MG: 25 TABLET, FILM COATED ORAL at 10:51

## 2017-03-27 RX ADMIN — METHYLPREDNISOLONE SODIUM SUCCINATE 40 MG: 40 INJECTION, POWDER, FOR SOLUTION INTRAMUSCULAR; INTRAVENOUS at 10:52

## 2017-03-27 RX ADMIN — ZOLPIDEM TARTRATE 5 MG: 5 TABLET ORAL at 22:02

## 2017-03-27 RX ADMIN — PANTOPRAZOLE SODIUM 40 MG: 40 TABLET, DELAYED RELEASE ORAL at 06:42

## 2017-03-27 RX ADMIN — INSULIN LISPRO 3 UNITS: 100 INJECTION, SOLUTION INTRAVENOUS; SUBCUTANEOUS at 17:09

## 2017-03-27 RX ADMIN — PANTOPRAZOLE SODIUM 40 MG: 40 TABLET, DELAYED RELEASE ORAL at 16:33

## 2017-03-27 RX ADMIN — AMITRIPTYLINE HYDROCHLORIDE 10 MG: 10 TABLET, FILM COATED ORAL at 22:03

## 2017-03-27 RX ADMIN — INSULIN LISPRO 2 UNITS: 100 INJECTION, SOLUTION INTRAVENOUS; SUBCUTANEOUS at 21:56

## 2017-03-27 RX ADMIN — GUAIFENESIN 600 MG: 600 TABLET, EXTENDED RELEASE ORAL at 10:52

## 2017-03-27 RX ADMIN — ZOLPIDEM TARTRATE 5 MG: 5 TABLET ORAL at 01:12

## 2017-03-27 RX ADMIN — LISINOPRIL 20 MG: 20 TABLET ORAL at 10:52

## 2017-03-27 RX ADMIN — IPRATROPIUM BROMIDE AND ALBUTEROL SULFATE 3 ML: .5; 3 SOLUTION RESPIRATORY (INHALATION) at 11:48

## 2017-03-27 RX ADMIN — ENOXAPARIN SODIUM 40 MG: 40 INJECTION SUBCUTANEOUS at 17:08

## 2017-03-27 RX ADMIN — CARBIDOPA AND LEVODOPA 10 MG: 25; 100 TABLET, EXTENDED RELEASE ORAL at 22:03

## 2017-03-27 RX ADMIN — GUAIFENESIN 600 MG: 600 TABLET, EXTENDED RELEASE ORAL at 20:26

## 2017-03-27 RX ADMIN — ONDANSETRON 4 MG: 2 INJECTION INTRAMUSCULAR; INTRAVENOUS at 04:55

## 2017-03-27 RX ADMIN — BUDESONIDE 500 MCG: 0.5 INHALANT RESPIRATORY (INHALATION) at 20:56

## 2017-03-27 RX ADMIN — ARFORMOTEROL TARTRATE 15 MCG: 15 SOLUTION RESPIRATORY (INHALATION) at 09:01

## 2017-03-27 RX ADMIN — BUDESONIDE 500 MCG: 0.5 INHALANT RESPIRATORY (INHALATION) at 09:01

## 2017-03-27 NOTE — ROUTINE PROCESS
Bedside shift change report given to Shanta Epstein RN (oncoming nurse) by Irasema Preciado RN (offgoing nurse). Report included the following information SBAR, Kardex, MAR and Recent Results.  NSR on tele

## 2017-03-27 NOTE — PROGRESS NOTES
Pt Alayna Rebolledo, [de-identified] yo female  Pt in bed during bedside report. Pt alert and oriented. Pt ambulate to bathroom with walker and assistance. Pt states she feels little lightheaded. Pt encouraged to continue to call for assistance to get out of bed. Pt verbalized agreement. Pt IV infiltrated during Levaquin administration. Unable to regain IV access.

## 2017-03-27 NOTE — ROUTINE PROCESS
Bedside and Verbal shift change report given to Claudia Arriaga RN (oncoming nurse) by Mag Giraldo   (offgoing nurse). Report included the following information SBAR and Kardex.

## 2017-03-27 NOTE — PROGRESS NOTES
Shift summary: Uneventful; Pt. Rested in bed with call light within reach. Up with assistance as needed. No complaints of pain. Zofran given for nausea, with relief noted. Pt. Lost IV site, after several attempts regained. Neb treatments given prn.      Patient Vitals for the past 12 hrs:   Temp Pulse Resp BP SpO2   03/26/17 1931 98.4 °F (36.9 °C) 86 18 151/55 98 %   03/26/17 1419 98.5 °F (36.9 °C) (!) 103 18 167/78 99 %   03/26/17 1030 97.7 °F (36.5 °C) 90 16 153/63 99 %

## 2017-03-27 NOTE — DISCHARGE INSTRUCTIONS
Lab Results   Component Value Date/Time    Hemoglobin A1c 6.7 03/25/2017 03:32 AM       This lab test reflects that your blood sugar has been slightly elevated over the past 3 months and should be evaluated by your primary care provider. An A1C of 5.7-6.4% meets the criteria for pre-diabetes; an A1C of 6.5% or higher meets the criteria for diabetes. Steroids can increase your blood sugar so it is important to follow up with your provider to determine if your blood sugar has returned to normal or needs further treatment. This lab test reflects that your blood sugar averaged 146 mg/dl over the past 3 months. It is important to follow up with your provider on a routine basis to continue to evaluate your blood sugar and discuss any necessary changes in treatment.

## 2017-03-27 NOTE — PROGRESS NOTES
Hospitalist Progress Note-critical care note     Patient: Juarez Ruvalcaba MRN: [id number]  CSN: [id number]    YOB: 1935  Age: [de-identified] y.o. Sex: female    DOA: 3/24/2017 LOS:  LOS: 3 days            Chief complaint: copd exacerbation . DM     Assessment/Plan         Patient Active Problem List   Diagnosis Code    DM (diabetes mellitus) ([facility] E11.9    HTN (hypertension) I10    Esophageal reflux K21.9    Urinary retention with incomplete bladder emptying R33.9    Unspecified constipation K59.00    Bronchitis J40    Acute exacerbation of COPD with asthma ([facility].) J44.1, J45.901    Tobacco use Z72.0    Increased urinary frequency R35.0    Hypokalemia E87.6    COPD exacerbation (HCC) J44.1    Chest congestion R09.89      1. Copd exacerbation with asthma, smoker   -will continue  breathing, levoquin and pulmcort . Robtussin,  -continue iv steroid, increase to Q8hr   2. DM type II  Add premeal insulin ,continue lantus,ssi   3. HTN, accelerated  Continue home medication   4. Hypokalemia  Resolved   5. Tobacco use  nicotine patch and education   6 reflux disease  Increase ppi   7 chest congestion/chest discomfort   ce negative. Ef wnl. No obvious  wall motion abnormalities identified in the views obtained. Subjective: still cough   nurse : no acute issue     Review of systems:    General: No fevers or chills. Cardiovascular: No chest pain or pressure. No palpitations. Pulmonary: coughs, shortness of breath better. Gastrointestinal: No nausea, vomiting. Vital signs/Intake and Output:  Visit Vitals    /72 (BP 1 Location: Left arm, BP Patient Position: At rest)    Pulse 84    Temp 98.4 °F (36.9 °C)    Resp 19    Ht 5' 7.5\" (1.715 m)    Wt 71.5 kg (157 lb 10.1 oz)    SpO2 95%    BMI 24.32 kg/m2     Current Shift:  03/27 0701 - 03/27 1900  In: 480 [P.O.:480]  Out: -   Last three shifts:  03/25 1901 - 03/27 0700  In: 420 [P.O.:220;  I.V.:200]  Out: 100 [Urine:100]    Physical Exam:  General: WD, WN. Alert, cooperative, no acute distress    HEENT: NC, Atraumatic. PERRLA, anicteric sclerae. Lungs:  Wheezing/Rhonchi, no Rales. Heart:  Regular  rhythm,  No murmur, No Rubs, No Gallops  Abdomen: Soft, Non distended, Non tender.  +Bowel sounds,   Extremities: No c/c/e  Psych:   Not anxious or agitated. Neurologic:  No acute neurological deficit. Labs: Results:       Chemistry Recent Labs      03/27/17 0536 03/26/17 0520 03/25/17   0332   GLU  175*  212*  188*   NA  141  143  141   K  3.5  3.9  3.2*   CL  105  107  103   CO2  29  27  27   BUN  31*  29*  17   CREA  1.09  1.34*  1.15   CA  8.2*  8.5  9.0   AGAP  7  9  11   BUCR  28*  22*  15   AP  82  85   --    TP  5.8*  5.9*   --    ALB  2.7*  2.7*   --    GLOB  3.1  3.2   --    AGRAT  0.9  0.8   --       CBC w/Diff Recent Labs      03/27/17 0536 03/26/17 0520 03/25/17   0332   WBC  13.2  17.5*  9.8   RBC  4.50  4.35  4.49   HGB  13.4  12.7  13.4   HCT  39.9  38.7  39.3   PLT  295  317  312   GRANS  78*  89*  87*   LYMPH  18*  8*  12*   EOS  0  0  0      Cardiac Enzymes Recent Labs      03/24/17   1845   CPK  43   CKND1  Cannot be calulated      Coagulation No results for input(s): PTP, INR, APTT in the last 72 hours. No lab exists for component: INREXT    Lipid Panel No results found for: CHOL, CHOLPOCT, CHOLX, CHLST, CHOLV, [id number], HDL, LDL, NLDLCT, DLDL, LDLC, DLDLP, [id number], VLDLC, VLDL, TGL, TGLX, TRIGL, [id number], TRIGP, TGLPOCT, [id number], CHHD, CHHDX   BNP No results for input(s): BNPP in the last 72 hours.    Liver Enzymes Recent Labs      03/27/17   0536   TP  5.8*   ALB  2.7*   AP  82   SGOT  10*      Thyroid Studies No results found for: T4, T3U, TSH, TSHEXT     Procedures/imaging: see electronic medical records for all procedures/Xrays and details which were not copied into this note but were reviewed prior to creation of Sully Lau MD

## 2017-03-27 NOTE — PROGRESS NOTES
0109- Pt requesting something for sleep. Pt given PRN Ambien as ordered in MAR. Shift Summary- Pt medicated for nausea and cough during the night. No changes in pt status noted.       Patient Vitals for the past 12 hrs:   Temp Pulse Resp BP SpO2   03/27/17 0351 97.8 °F (36.6 °C) 67 18 155/64 98 %   03/26/17 2324 97.8 °F (36.6 °C) 85 18 161/70 98 %   03/26/17 1931 98.4 °F (36.9 °C) 86 18 151/55 98 %

## 2017-03-27 NOTE — PROGRESS NOTES
Checked with patient to see if she would like another breathing treatment. She says she feels too shaky and would like to pass at his time.

## 2017-03-28 PROBLEM — K59.00 CONSTIPATION: Status: ACTIVE | Noted: 2017-03-28

## 2017-03-28 LAB
GLUCOSE BLD STRIP.AUTO-MCNC: 149 MG/DL (ref 70–110)
GLUCOSE BLD STRIP.AUTO-MCNC: 154 MG/DL (ref 70–110)
GLUCOSE BLD STRIP.AUTO-MCNC: 161 MG/DL (ref 70–110)
GLUCOSE BLD STRIP.AUTO-MCNC: 194 MG/DL (ref 70–110)
GLUCOSE BLD STRIP.AUTO-MCNC: 214 MG/DL (ref 70–110)
MAGNESIUM SERPL-MCNC: 2.1 MG/DL (ref 1.8–2.4)

## 2017-03-28 PROCEDURE — 65270000029 HC RM PRIVATE

## 2017-03-28 PROCEDURE — 36415 COLL VENOUS BLD VENIPUNCTURE: CPT | Performed by: HOSPITALIST

## 2017-03-28 PROCEDURE — 83735 ASSAY OF MAGNESIUM: CPT | Performed by: HOSPITALIST

## 2017-03-28 PROCEDURE — 74011250637 HC RX REV CODE- 250/637: Performed by: HOSPITALIST

## 2017-03-28 PROCEDURE — 94640 AIRWAY INHALATION TREATMENT: CPT

## 2017-03-28 PROCEDURE — 74011000250 HC RX REV CODE- 250: Performed by: HOSPITALIST

## 2017-03-28 PROCEDURE — 94760 N-INVAS EAR/PLS OXIMETRY 1: CPT

## 2017-03-28 PROCEDURE — 74011250636 HC RX REV CODE- 250/636: Performed by: HOSPITALIST

## 2017-03-28 PROCEDURE — 82962 GLUCOSE BLOOD TEST: CPT

## 2017-03-28 PROCEDURE — 74011250637 HC RX REV CODE- 250/637: Performed by: INTERNAL MEDICINE

## 2017-03-28 RX ORDER — ADHESIVE BANDAGE
30 BANDAGE TOPICAL DAILY PRN
Status: DISCONTINUED | OUTPATIENT
Start: 2017-03-28 | End: 2017-03-31 | Stop reason: HOSPADM

## 2017-03-28 RX ORDER — POLYETHYLENE GLYCOL 3350 17 G/17G
17 POWDER, FOR SOLUTION ORAL 2 TIMES DAILY
Status: DISCONTINUED | OUTPATIENT
Start: 2017-03-28 | End: 2017-03-31 | Stop reason: HOSPADM

## 2017-03-28 RX ORDER — HYDRALAZINE HYDROCHLORIDE 25 MG/1
75 TABLET, FILM COATED ORAL 3 TIMES DAILY
Status: DISCONTINUED | OUTPATIENT
Start: 2017-03-28 | End: 2017-03-29

## 2017-03-28 RX ORDER — INSULIN LISPRO 100 [IU]/ML
5 INJECTION, SOLUTION INTRAVENOUS; SUBCUTANEOUS
Status: DISCONTINUED | OUTPATIENT
Start: 2017-03-28 | End: 2017-03-29

## 2017-03-28 RX ADMIN — METHYLPREDNISOLONE SODIUM SUCCINATE 40 MG: 40 INJECTION, POWDER, FOR SOLUTION INTRAMUSCULAR; INTRAVENOUS at 08:10

## 2017-03-28 RX ADMIN — DIAZEPAM 5 MG: 5 TABLET ORAL at 12:52

## 2017-03-28 RX ADMIN — DIAZEPAM 5 MG: 5 TABLET ORAL at 03:28

## 2017-03-28 RX ADMIN — ONDANSETRON 4 MG: 2 INJECTION INTRAMUSCULAR; INTRAVENOUS at 04:55

## 2017-03-28 RX ADMIN — DOCUSATE SODIUM 100 MG: 100 CAPSULE, LIQUID FILLED ORAL at 10:18

## 2017-03-28 RX ADMIN — DOCUSATE SODIUM 100 MG: 100 CAPSULE, LIQUID FILLED ORAL at 22:09

## 2017-03-28 RX ADMIN — INSULIN LISPRO 5 UNITS: 100 INJECTION, SOLUTION INTRAVENOUS; SUBCUTANEOUS at 17:17

## 2017-03-28 RX ADMIN — ENOXAPARIN SODIUM 40 MG: 40 INJECTION SUBCUTANEOUS at 17:18

## 2017-03-28 RX ADMIN — METHYLPREDNISOLONE SODIUM SUCCINATE 40 MG: 40 INJECTION, POWDER, FOR SOLUTION INTRAMUSCULAR; INTRAVENOUS at 00:35

## 2017-03-28 RX ADMIN — NIFEDIPINE 90 MG: 30 TABLET, FILM COATED, EXTENDED RELEASE ORAL at 10:20

## 2017-03-28 RX ADMIN — ARFORMOTEROL TARTRATE 15 MCG: 15 SOLUTION RESPIRATORY (INHALATION) at 07:03

## 2017-03-28 RX ADMIN — PANTOPRAZOLE SODIUM 40 MG: 40 TABLET, DELAYED RELEASE ORAL at 17:20

## 2017-03-28 RX ADMIN — GUAIFENESIN 600 MG: 600 TABLET, EXTENDED RELEASE ORAL at 10:17

## 2017-03-28 RX ADMIN — ONDANSETRON 4 MG: 2 INJECTION INTRAMUSCULAR; INTRAVENOUS at 22:14

## 2017-03-28 RX ADMIN — Medication 10 ML: at 22:10

## 2017-03-28 RX ADMIN — INSULIN LISPRO 3 UNITS: 100 INJECTION, SOLUTION INTRAVENOUS; SUBCUTANEOUS at 13:07

## 2017-03-28 RX ADMIN — HYDRALAZINE HYDROCHLORIDE 75 MG: 25 TABLET, FILM COATED ORAL at 17:20

## 2017-03-28 RX ADMIN — MAGNESIUM HYDROXIDE 30 ML: 400 SUSPENSION ORAL at 13:06

## 2017-03-28 RX ADMIN — POLYETHYLENE GLYCOL 3350 17 G: 17 POWDER, FOR SOLUTION ORAL at 22:09

## 2017-03-28 RX ADMIN — INSULIN LISPRO 4 UNITS: 100 INJECTION, SOLUTION INTRAVENOUS; SUBCUTANEOUS at 13:06

## 2017-03-28 RX ADMIN — METHYLPREDNISOLONE SODIUM SUCCINATE 40 MG: 40 INJECTION, POWDER, FOR SOLUTION INTRAMUSCULAR; INTRAVENOUS at 17:20

## 2017-03-28 RX ADMIN — Medication 10 ML: at 00:40

## 2017-03-28 RX ADMIN — INSULIN LISPRO 2 UNITS: 100 INJECTION, SOLUTION INTRAVENOUS; SUBCUTANEOUS at 22:11

## 2017-03-28 RX ADMIN — ONDANSETRON 4 MG: 2 INJECTION INTRAMUSCULAR; INTRAVENOUS at 00:35

## 2017-03-28 RX ADMIN — INSULIN GLARGINE 14 UNITS: 100 INJECTION, SOLUTION SUBCUTANEOUS at 22:11

## 2017-03-28 RX ADMIN — GUAIFENESIN 600 MG: 600 TABLET, EXTENDED RELEASE ORAL at 22:09

## 2017-03-28 RX ADMIN — ONDANSETRON 4 MG: 2 INJECTION INTRAMUSCULAR; INTRAVENOUS at 10:18

## 2017-03-28 RX ADMIN — CARBIDOPA AND LEVODOPA 10 MG: 25; 100 TABLET, EXTENDED RELEASE ORAL at 22:09

## 2017-03-28 RX ADMIN — ONDANSETRON 4 MG: 2 INJECTION INTRAMUSCULAR; INTRAVENOUS at 14:18

## 2017-03-28 RX ADMIN — Medication 10 ML: at 17:19

## 2017-03-28 RX ADMIN — LEVOFLOXACIN 500 MG: 5 INJECTION, SOLUTION INTRAVENOUS at 00:36

## 2017-03-28 RX ADMIN — ASPIRIN 81 MG 81 MG: 81 TABLET ORAL at 10:18

## 2017-03-28 RX ADMIN — CARBIDOPA AND LEVODOPA 10 MG: 25; 100 TABLET, EXTENDED RELEASE ORAL at 08:21

## 2017-03-28 RX ADMIN — BUDESONIDE 500 MCG: 0.5 INHALANT RESPIRATORY (INHALATION) at 20:32

## 2017-03-28 RX ADMIN — LISINOPRIL 20 MG: 20 TABLET ORAL at 10:18

## 2017-03-28 RX ADMIN — BUDESONIDE 500 MCG: 0.5 INHALANT RESPIRATORY (INHALATION) at 07:03

## 2017-03-28 RX ADMIN — INSULIN LISPRO 3 UNITS: 100 INJECTION, SOLUTION INTRAVENOUS; SUBCUTANEOUS at 08:55

## 2017-03-28 RX ADMIN — CLOPIDOGREL BISULFATE 75 MG: 75 TABLET, FILM COATED ORAL at 10:17

## 2017-03-28 RX ADMIN — HYDRALAZINE HYDROCHLORIDE 50 MG: 25 TABLET, FILM COATED ORAL at 10:18

## 2017-03-28 RX ADMIN — Medication 10 ML: at 07:41

## 2017-03-28 RX ADMIN — AMITRIPTYLINE HYDROCHLORIDE 10 MG: 10 TABLET, FILM COATED ORAL at 22:09

## 2017-03-28 RX ADMIN — PANTOPRAZOLE SODIUM 40 MG: 40 TABLET, DELAYED RELEASE ORAL at 06:47

## 2017-03-28 RX ADMIN — HYDRALAZINE HYDROCHLORIDE 75 MG: 25 TABLET, FILM COATED ORAL at 22:09

## 2017-03-28 RX ADMIN — ARFORMOTEROL TARTRATE 15 MCG: 15 SOLUTION RESPIRATORY (INHALATION) at 20:32

## 2017-03-28 RX ADMIN — INSULIN LISPRO 2 UNITS: 100 INJECTION, SOLUTION INTRAVENOUS; SUBCUTANEOUS at 17:16

## 2017-03-28 RX ADMIN — CARBIDOPA AND LEVODOPA 10 MG: 25; 100 TABLET, EXTENDED RELEASE ORAL at 17:20

## 2017-03-28 NOTE — PROGRESS NOTES
Pt sitting in bed. C/o nausea zofran given earlier not due till 80. Explained to pt medication not due. Spoke with pt to take mind off nausea, appears needy and wants reassurance. Non productive cough noted.

## 2017-03-28 NOTE — PROGRESS NOTES
Hospitalist Progress Note-critical care note     Patient: Madiha Carroll MRN: [id number]  CSN: [id number]    YOB: 1935  Age: [de-identified] y.o. Sex: female    DOA: 3/24/2017 LOS:  LOS: 4 days            Chief complaint: copd exacerbation . DM     Assessment/Plan         Patient Active Problem List   Diagnosis Code    DM (diabetes mellitus) ([facility] E11.9    HTN (hypertension) I10    Esophageal reflux K21.9    Urinary retention with incomplete bladder emptying R33.9    Unspecified constipation K59.00    Bronchitis J40    [facility] of [facility] J44.1, J45.901    Tobacco use Z72.0    Increased urinary frequency R35.0    Hypokalemia E87.6    COPD exacerbation (HCC) J44.1    Chest congestion R09.89      1. Copd exacerbation with asthma, smoker   -will continue  breathing, levoquin and pulmcort . Robtussin,  -continue iv steroid Q8hr   2. DM type II   continue premeal insulin ,continue lantus,ssi   3. HTN, accelerated  Increase hydralazine for better control. 4. Hypokalemia  Resolved   5. Tobacco use  nicotine patch and education   6 reflux disease  PPI   7 chest congestion/chest discomfort   ce negative. Ef wnl. No obvious  wall motion abnormalities identified in the views obtained. 8 constipation   mom, colace and miralax   Subjective: wheezing a little bit better    nurse : no acute issue     Review of systems:    General: No fevers or chills. Cardiovascular: No chest pain or pressure. No palpitations. Pulmonary: coughs, shortness of breath better. Gastrointestinal: No nausea, vomiting.      Vital signs/Intake and Output:  Visit Vitals    BP (!) 175/109 (BP 1 Location: Left arm, BP Patient Position: At rest)    Pulse 97    Temp 98.3 °F (36.8 °C)    Resp 22    Ht 5' 7.5\" (1.715 m)    Wt 70.9 kg (156 lb 3.2 oz)    SpO2 98%    BMI 24.1 kg/m2     Current Shift:  03/28 0701 - 03/28 1900  In: 180 [P.O.:180]  Out: -   Last three shifts:  03/26 1901 - 03/28 0700  In: 680 [P.O.:480; I.V.:200]  Out: 0     Physical Exam:  General: WD, WN. Alert, cooperative, no acute distress    HEENT: NC, Atraumatic. PERRLA, anicteric sclerae. Lungs:  Wheezing/Rhonchi, no Rales. Heart:  Regular  rhythm,  No murmur, No Rubs, No Gallops  Abdomen: Soft, Non distended, Non tender.  +Bowel sounds,   Extremities: No c/c/e  Psych:   Not anxious or agitated. Neurologic:  No acute neurological deficit. Labs: Results:       Chemistry Recent Labs      03/27/17 0536 03/26/17   0520   GLU  175*  212*   NA  141  143   K  3.5  3.9   CL  105  107   CO2  29  27   BUN  31*  29*   CREA  1.09  1.34*   CA  8.2*  8.5   AGAP  7  9   BUCR  28*  22*   AP  82  85   TP  5.8*  5.9*   ALB  2.7*  2.7*   GLOB  3.1  3.2   AGRAT  0.9  0.8      CBC w/Diff Recent Labs      03/27/17 0536 03/26/17   0520   WBC  13.2  17.5*   RBC  4.50  4.35   HGB  13.4  12.7   HCT  39.9  38.7   PLT  295  317   GRANS  78*  89*   LYMPH  18*  8*   EOS  0  0      Cardiac Enzymes No results for input(s): CPK, CKND1, LIBRADO in the last 72 hours. No lab exists for component: CKRMB, TROIP   Coagulation No results for input(s): PTP, INR, APTT in the last 72 hours. No lab exists for component: INREXT, INREXT    Lipid Panel No results found for: CHOL, CHOLPOCT, CHOLX, CHLST, CHOLV, [id number], HDL, LDL, NLDLCT, DLDL, LDLC, DLDLP, 549501, VLDLC, VLDL, TGL, TGLX, TRIGL, [id number], TRIGP, TGLPOCT, [id number], CHHD, CHHDX   BNP No results for input(s): BNPP in the last 72 hours.    Liver Enzymes Recent Labs      03/27/17 0536   TP  5.8*   ALB  2.7*   AP  82   SGOT  10*      Thyroid Studies No results found for: T4, T3U, TSH, TSHEXT, TSHEXT     Procedures/imaging: see electronic medical records for all procedures/Xrays and details which were not copied into this note but were reviewed prior to creation of Massiel Valentino MD

## 2017-03-28 NOTE — ROUTINE PROCESS
Bedside and Verbal shift change report given to Singh Padilla RN (oncoming nurse) by .Quinn Pereira   (offgoing nurse). Report included the following information SBAR, Kardex, Intake/Output, MAR and Recent Results.

## 2017-03-28 NOTE — ROUTINE PROCESS
Bedside and Verbal shift change report given to ABBI Claros RN (oncoming nurse) by XUAN Abbott RN (offgoing nurse). Report included the following information SBAR, Kardex, Intake/Output and MAR.

## 2017-03-28 NOTE — PROGRESS NOTES
Shift summary: Pt A&Ox4, up with assistance. Ambulating to the bathroom. Pt denies pain. Pt lost IV access during day shift. Restarted IV. Pt reports nausea- stated that PO Zofran does not work, only IV Zofran does. Administered IV Zofran. Appears to be resting comfortably. No visible signs of distress. Patient Vitals for the past 8 hrs:   Temp Pulse Resp BP SpO2   03/27/17 2237 97.9 °F (36.6 °C) 90 18 168/67 99 %   03/27/17 1947 98.1 °F (36.7 °C) 100 20 162/76 98 %         Date 03/27/17 0700 - 03/28/17 0659 03/28/17 0700 - 03/29/17 0659   Shift 4115-4208 2222-2000 24 Hour Total 4832-5896 6741-4380 24 Hour Total   I  N  T  A  K  E   P.O. 480  480         P. O. 480  480       I.V.  (mL/kg/hr)  200 200         Volume (levoFLOXacin (LEVAQUIN) 500 mg in D5W IVPB)  200 200       Shift Total  (mL/kg) 480  (6.7) 200  (2.8) 680  (9.6)      O  U  T  P  U  T   Urine  (mL/kg/hr)            Urine Occurrence(s) 1 x 1 x 2 x       Emesis/NG output            Emesis Occurrence(s)  0 x 0 x       Stool            Stool Occurrence(s)  0 x 0 x       Shift Total  (mL/kg)          200 680      Weight (kg) 71.5 70.9 70.9 70.9 70.9 70.9

## 2017-03-28 NOTE — ROUTINE PROCESS
Bedside and Verbal shift change report given to XUAN Brenner RN (oncoming nurse) by Maryan Hutson RN (offgoing nurse). Report included the following information SBAR, Kardex, Intake/Output, MAR, Accordion, Recent Results and Med Rec Status.

## 2017-03-28 NOTE — PROGRESS NOTES
Valium given for anxiety, pt still not able to sleep. Assisted to bathroom earlier to void    0455 Zofran given for complaint of nausea. Pt stated she only had 2 hrs of sleep, no distress noted. 9309 Shift summary: pt is A/O x4, up with assist to bathroom. No complaint of pain but complained of persistent nausea - medicated with Zofran every 4 hrs as needed, ice chips provided. Skin intact, non-productive cough, on room air.  Shift uneventful

## 2017-03-28 NOTE — PROGRESS NOTES
conducted an initial consultation and Spiritual Assessment for Zaina Zambrano, who is a [de-identified] y.o.,female. Patients Primary Language is: [place]. According to the patients EMR Spiritism Affiliation is: [place]. The reason the Patient came to the hospital is:   Patient Active Problem List    Diagnosis Date Noted    Hypokalemia 03/24/2017    [facility] 03/24/2017    Chest congestion 03/24/2017    Increased urinary frequency 04/01/2016    Acute exacerbation of COPD with asthma ([facility] 03/28/2016    Tobacco use 03/28/2016    Bronchitis 04/25/2015    Unspecified constipation 08/19/2012    Urinary retention with incomplete bladder emptying 08/16/2012    DM (diabetes mellitus) ([facility] 08/15/2012    HTN (hypertension) 08/15/2012    Esophageal reflux 08/15/2012        The  provided the following Interventions:  Initiated a relationship of care and support. Explored issues of jose, belief, spirituality and Christianity/ritual needs while hospitalized. Listened empathically. Provided chaplaincy education. Provided information about Spiritual Care Services. Offered assurance of prayer on patient's behalf. Chart reviewed. The following outcomes where achieved:  Patient shared limited information about both their medical narrative and spiritual journey/beliefs.  confirmed Patient's Spiritism Affiliation. Patient processed feeling about current hospitalization. Patient expressed gratitude for 's visit. Assessment:  Patient does not have any Christianity/cultural needs that will affect patients preferences in health care. Plan:  Chaplains will continue to follow and will provide pastoral care on an as needed/requested basis.  recommends bedside caregivers page  on duty if patient shows signs of acute spiritual or emotional distress. Rev.  Sana Patel  [address] [facility] [place] [facility] [place]  [phone]

## 2017-03-29 LAB
GLUCOSE BLD STRIP.AUTO-MCNC: 128 MG/DL (ref 70–110)
GLUCOSE BLD STRIP.AUTO-MCNC: 128 MG/DL (ref 70–110)
GLUCOSE BLD STRIP.AUTO-MCNC: 207 MG/DL (ref 70–110)
MAGNESIUM SERPL-MCNC: 2.6 MG/DL (ref 1.8–2.4)

## 2017-03-29 PROCEDURE — 65270000029 HC RM PRIVATE

## 2017-03-29 PROCEDURE — 74011000250 HC RX REV CODE- 250: Performed by: HOSPITALIST

## 2017-03-29 PROCEDURE — 74011250636 HC RX REV CODE- 250/636: Performed by: HOSPITALIST

## 2017-03-29 PROCEDURE — 36415 COLL VENOUS BLD VENIPUNCTURE: CPT | Performed by: HOSPITALIST

## 2017-03-29 PROCEDURE — 74011636637 HC RX REV CODE- 636/637: Performed by: HOSPITALIST

## 2017-03-29 PROCEDURE — 94760 N-INVAS EAR/PLS OXIMETRY 1: CPT

## 2017-03-29 PROCEDURE — 83735 ASSAY OF MAGNESIUM: CPT | Performed by: HOSPITALIST

## 2017-03-29 PROCEDURE — 74011250637 HC RX REV CODE- 250/637: Performed by: INTERNAL MEDICINE

## 2017-03-29 PROCEDURE — 74011250637 HC RX REV CODE- 250/637: Performed by: FAMILY MEDICINE

## 2017-03-29 PROCEDURE — 82962 GLUCOSE BLOOD TEST: CPT

## 2017-03-29 PROCEDURE — 94640 AIRWAY INHALATION TREATMENT: CPT

## 2017-03-29 PROCEDURE — 74011250637 HC RX REV CODE- 250/637: Performed by: HOSPITALIST

## 2017-03-29 RX ORDER — INSULIN LISPRO 100 [IU]/ML
3 INJECTION, SOLUTION INTRAVENOUS; SUBCUTANEOUS
Status: DISCONTINUED | OUTPATIENT
Start: 2017-03-29 | End: 2017-03-30

## 2017-03-29 RX ORDER — HYDRALAZINE HYDROCHLORIDE 25 MG/1
75 TABLET, FILM COATED ORAL 3 TIMES DAILY
Status: DISCONTINUED | OUTPATIENT
Start: 2017-03-29 | End: 2017-03-31 | Stop reason: HOSPADM

## 2017-03-29 RX ADMIN — GUAIFENESIN 600 MG: 600 TABLET, EXTENDED RELEASE ORAL at 22:18

## 2017-03-29 RX ADMIN — Medication 10 ML: at 07:34

## 2017-03-29 RX ADMIN — HYDRALAZINE HYDROCHLORIDE 75 MG: 25 TABLET, FILM COATED ORAL at 14:49

## 2017-03-29 RX ADMIN — DIAZEPAM 5 MG: 5 TABLET ORAL at 14:49

## 2017-03-29 RX ADMIN — SODIUM PHOSPHATE, DIBASIC AND SODIUM PHOSPHATE, MONOBASIC 118 ML: 7; 19 ENEMA RECTAL at 14:53

## 2017-03-29 RX ADMIN — METHYLPREDNISOLONE SODIUM SUCCINATE 40 MG: 40 INJECTION, POWDER, FOR SOLUTION INTRAMUSCULAR; INTRAVENOUS at 00:57

## 2017-03-29 RX ADMIN — AMITRIPTYLINE HYDROCHLORIDE 10 MG: 10 TABLET, FILM COATED ORAL at 22:18

## 2017-03-29 RX ADMIN — METHYLPREDNISOLONE SODIUM SUCCINATE 40 MG: 40 INJECTION, POWDER, FOR SOLUTION INTRAMUSCULAR; INTRAVENOUS at 09:06

## 2017-03-29 RX ADMIN — INSULIN LISPRO 4 UNITS: 100 INJECTION, SOLUTION INTRAVENOUS; SUBCUTANEOUS at 18:37

## 2017-03-29 RX ADMIN — INSULIN LISPRO 3 UNITS: 100 INJECTION, SOLUTION INTRAVENOUS; SUBCUTANEOUS at 18:38

## 2017-03-29 RX ADMIN — METHYLPREDNISOLONE SODIUM SUCCINATE 40 MG: 40 INJECTION, POWDER, FOR SOLUTION INTRAMUSCULAR; INTRAVENOUS at 23:35

## 2017-03-29 RX ADMIN — CARBIDOPA AND LEVODOPA 10 MG: 25; 100 TABLET, EXTENDED RELEASE ORAL at 22:18

## 2017-03-29 RX ADMIN — DIAZEPAM 5 MG: 5 TABLET ORAL at 02:14

## 2017-03-29 RX ADMIN — DOCUSATE SODIUM 100 MG: 100 CAPSULE, LIQUID FILLED ORAL at 22:18

## 2017-03-29 RX ADMIN — LEVOFLOXACIN 500 MG: 5 INJECTION, SOLUTION INTRAVENOUS at 23:36

## 2017-03-29 RX ADMIN — POLYETHYLENE GLYCOL 3350 17 G: 17 POWDER, FOR SOLUTION ORAL at 09:06

## 2017-03-29 RX ADMIN — NIFEDIPINE 90 MG: 30 TABLET, FILM COATED, EXTENDED RELEASE ORAL at 09:06

## 2017-03-29 RX ADMIN — HYDROCODONE POLISTIREX AND CHLORPHENIRAMINE POLISTIREX 5 ML: 10; 8 SUSPENSION, EXTENDED RELEASE ORAL at 23:35

## 2017-03-29 RX ADMIN — ZOLPIDEM TARTRATE 5 MG: 5 TABLET ORAL at 01:07

## 2017-03-29 RX ADMIN — PANTOPRAZOLE SODIUM 40 MG: 40 TABLET, DELAYED RELEASE ORAL at 17:29

## 2017-03-29 RX ADMIN — LEVOFLOXACIN 500 MG: 5 INJECTION, SOLUTION INTRAVENOUS at 00:57

## 2017-03-29 RX ADMIN — HYDROCODONE POLISTIREX AND CHLORPHENIRAMINE POLISTIREX 5 ML: 10; 8 SUSPENSION, EXTENDED RELEASE ORAL at 04:45

## 2017-03-29 RX ADMIN — ONDANSETRON 4 MG: 2 INJECTION INTRAMUSCULAR; INTRAVENOUS at 23:37

## 2017-03-29 RX ADMIN — POLYETHYLENE GLYCOL 3350 17 G: 17 POWDER, FOR SOLUTION ORAL at 22:17

## 2017-03-29 RX ADMIN — PANTOPRAZOLE SODIUM 40 MG: 40 TABLET, DELAYED RELEASE ORAL at 07:34

## 2017-03-29 RX ADMIN — ARFORMOTEROL TARTRATE 15 MCG: 15 SOLUTION RESPIRATORY (INHALATION) at 19:30

## 2017-03-29 RX ADMIN — BUDESONIDE 500 MCG: 0.5 INHALANT RESPIRATORY (INHALATION) at 19:30

## 2017-03-29 RX ADMIN — ARFORMOTEROL TARTRATE 15 MCG: 15 SOLUTION RESPIRATORY (INHALATION) at 07:05

## 2017-03-29 RX ADMIN — INSULIN LISPRO 5 UNITS: 100 INJECTION, SOLUTION INTRAVENOUS; SUBCUTANEOUS at 09:05

## 2017-03-29 RX ADMIN — INSULIN GLARGINE 14 UNITS: 100 INJECTION, SOLUTION SUBCUTANEOUS at 22:17

## 2017-03-29 RX ADMIN — HYDRALAZINE HYDROCHLORIDE 75 MG: 25 TABLET, FILM COATED ORAL at 22:18

## 2017-03-29 RX ADMIN — ENOXAPARIN SODIUM 40 MG: 40 INJECTION SUBCUTANEOUS at 17:29

## 2017-03-29 RX ADMIN — CARBIDOPA AND LEVODOPA 10 MG: 25; 100 TABLET, EXTENDED RELEASE ORAL at 17:29

## 2017-03-29 RX ADMIN — BUDESONIDE 500 MCG: 0.5 INHALANT RESPIRATORY (INHALATION) at 07:05

## 2017-03-29 RX ADMIN — Medication 10 ML: at 18:39

## 2017-03-29 RX ADMIN — BENZONATATE 100 MG: 100 CAPSULE ORAL at 17:34

## 2017-03-29 NOTE — PROGRESS NOTES
Hospitalist Progress Note-critical care note     Patient: Juarez Ruvalcaba MRN: [id number]  CSN: [id number]    YOB: 1935  Age: [de-identified] y.o. Sex: female    DOA: 3/24/2017 LOS:  LOS: 5 days            Chief complaint: copd exacerbation . DM , constipation     Assessment/Plan         Patient Active Problem List   Diagnosis Code    [facility] E11.9    HTN (hypertension) I10    Esophageal reflux K21.9    Urinary retention with incomplete bladder emptying R33.9    Unspecified constipation K59.00    Bronchitis J40    [facility] of [facility] with [facility] J44.1, J45.901    Tobacco use Z72.0    Increased urinary frequency R35.0    Hypokalemia E87.6    COPD exacerbation (HCC) J44.1    Chest congestion R09.89    Constipation K59.00      1. Copd exacerbation with asthma, smoker   -improving, but slowly. continue  breathing, levoquin and pulmcort . Robtussin,  -weaning iv steroid   2. DM type II   decrease premeal insulin ,continue lantus,ssi   3. HTN, accelerated  Continue hydralazine for better control. 4. Hypokalemia  Resolved   5. Tobacco use  nicotine patch and education   6 reflux disease  PPI   7 chest congestion/chest discomfort   No episodes reported   ce negative. Ef wnl. No obvious  wall motion abnormalities identified in the views obtained. 8 constipation   mom, colace and miralax , enema       Subjective: better today, still no bm , passing gas    nurse : no acute issue     Review of systems:    General: No fevers or chills. Cardiovascular: No chest pain or pressure. No palpitations. Pulmonary: coughs better , shortness of breath better. Gastrointestinal: No nausea, vomiting.      Vital signs/Intake and Output:  Visit Vitals    /68    Pulse 91    Temp 98.6 °F (37 °C)    Resp 17    Ht 5' 7.5\" (1.715 m)    Wt 70.9 kg (156 lb 3.2 oz)    SpO2 100%    BMI 24.1 kg/m2     Current Shift:     Last three shifts:  03/27 1901 - 03/29 0700  In: 720 [P.O.:420; I.V.:300]  Out: -     Physical Exam:  General: WD, WN. Alert, cooperative, no acute distress    HEENT: NC, Atraumatic. PERRLA, anicteric sclerae. Lungs: No  Wheezing, Rhonchi and increased BS  Heart:  Regular  rhythm,  No murmur, No Rubs, No Gallops  Abdomen: Soft, Non distended, Non tender.  +Bowel sounds,   Extremities: No c/c/e  Psych:   Not anxious or agitated. Neurologic:  No acute neurological deficit. Labs: Results:       Chemistry Recent Labs      03/27/17   0536   GLU  175*   NA  141   K  3.5   CL  105   CO2  29   BUN  31*   CREA  1.09   CA  8.2*   AGAP  7   BUCR  28*   AP  82   TP  5.8*   ALB  2.7*   GLOB  3.1   AGRAT  0.9      CBC w/Diff Recent Labs      03/27/17   0536   WBC  13.2   RBC  4.50   HGB  13.4   HCT  39.9   PLT  295   GRANS  78*   LYMPH  18*   EOS  0      Cardiac Enzymes No results for input(s): CPK, CKND1, LIBRADO in the last 72 hours. No lab exists for component: CKRMB, TROIP   Coagulation No results for input(s): PTP, INR, APTT in the last 72 hours. No lab exists for component: INREXT, INREXT    Lipid Panel No results found for: CHOL, CHOLPOCT, CHOLX, CHLST, CHOLV, [id number], HDL, LDL, NLDLCT, DLDL, LDLC, DLDLP, [id number], VLDLC, VLDL, TGL, TGLX, TRIGL, [id number], TRIGP, TGLPOCT, [id number], CHHD, CHHDX   BNP No results for input(s): BNPP in the last 72 hours.    Liver Enzymes Recent Labs      03/27/17   0536   TP  5.8*   ALB  2.7*   AP  82   SGOT  10*      Thyroid Studies No results found for: T4, T3U, TSH, TSHEXT, TSHEXT     Procedures/imaging: see electronic medical records for all procedures/Xrays and details which were not copied into this note but were reviewed prior to creation of Henry Garsia MD

## 2017-03-29 NOTE — PROGRESS NOTES
Shift summary: Pt given Ambien and Valium for sleep, encouraged to turn on TV to music for relaxation. Up to bathroom with assist. On room air, no SOB on exertion, expiratory wheezing noted. Tussionex given as ordered prn for cough. Denied pain. Dr Umesh Bloom made aware of Potassium of 3.4. Tele monitor - NSR. sshift uneventful. Patient Vitals for the past 8 hrs:   Temp Pulse Resp BP SpO2   03/29/17 0707 - - - - 98 %   03/29/17 0321 97.8 °F (36.6 °C) 72 18 152/63 100 %         Bedside and Verbal shift change report given to Meggan Schuler RN (oncoming nurse) by Demetria Drummond RN   (offgoing nurse). Report included the following information SBAR, Kardex, Intake/Output, MAR and Recent Results.

## 2017-03-29 NOTE — DIABETES MGMT
Diabetes Patient/Family Education Record    Factors That  May Influence Patients Ability  to Learn or  Comply With  Recommendations:    []   Language barrier    []   Cultural needs   []   Motivation    []   Cognitive limitation    []   Physical   []   Education    []   Physiological factors   []   Hearing/vision/speaking impairment   []   Jain beliefs    []   Financial factors   []  Other:   [x]  No factors identified at this time.      Person Instructed:   [x]   Patient   []   Family   []  Other     Preference for Learning:   [x]   Verbal   [x]   Written   []  Demonstration     Level of Comprehension & Competence:    [x]  Good                                      [] Fair                                     []  Poor                             []  Needs Reinforcement   [x]  Teachback completed    Education Component:   [x]  Medication management, including confirmation of home regimen, taking glimiperide with meals to avoid hypoglycemia   [x]  Nutritional management, importance of regular meals   []  Exercise   [x]  Signs, symptoms, and treatment of hyperglycemia and hypoglycemia   [x]  Prevention, recognition and treatment of hyperglycemia and hypoglycemia   [x]  Importance of blood glucose monitoring    []  Instruction on use of blood glucose meter   [x]  Discuss the importance of HbA1C monitoring and provide patient with  results   [x]  Sick day guidelines   []  Proper use and disposal of lancets, needles, syringes or insulin pens (if appropriate)   []  Potential long-term complications (retinopathy, kidney disease, neuropathy, heart disease, stroke, vascular disease, foot care)   [] Provide emergency contact number and contact number for more information    [x]  Goal:  Patient/family will demonstrate understanding of Diabetes Self Management Skills by: (date) 4/21  Plan for post-discharge education or self-management support:    [x] Outpatient class schedule provided            [] Patient Declined    [] Scheduled for outpatient classes (date) _______         Chacorta Reyez RN, MS  Glycemic Control Team

## 2017-03-29 NOTE — DIABETES MGMT
GLYCEMIC CONTROL AND NUTRITION PROGRESS NOTE:    Assessment/Recommendations:  -pt with known h/o of T2DM, controlled  -IV steroids onboard r/t COPD exacerbation  -BG out of range   -basal/bolus + corrective coverage orders in place recommend continue and adjust as needed  -basic education provided (see ed note)    Subjective:  -pt reports she checks BG every AM, denies hypoglycemic events, BG ranges in 120s  -confirmation of home insulin regimen    Goal:  - BG will be in target range of  mg/dl (non-ICU) by 4/6  -Pt will eat 75% of meals offered by 4/6    Most recent blood glucose values:   Lab Results   Component Value Date/Time    GLUCPOC 128 (H) 03/29/2017 07:52 AM    GLUCPOC 154 (H) 03/28/2017 09:11 PM    GLUCPOC 161 (H) 03/28/2017 04:47 PM         Current A1C is equivalent to average blood glucose of 146 mg/dl over the past 2-3 months. Lab Results   Component Value Date/Time    Hemoglobin A1c 6.7 03/25/2017 03:32 AM         Current hospital diabetes medications:   Lantus 14 units daily  Humalog 5 units TID AC  Humalog corrective coverage    Previous day's insulin requirements:   TDD = 33 (Lantus 14 units + Humalog 11 units AC + 8 units corrective coverage)    Home diabetes medications:  Glimepiride 1 mg daily     Body mass index is 24.1 kg/(m^2).   Last 3 Recorded Weights in this Encounter    03/26/17 2324 03/27/17 2237 03/28/17 2034   Weight: 71.5 kg (157 lb 10.1 oz) 70.9 kg (156 lb 3.2 oz) 70.9 kg (156 lb 3.2 oz)    Ht Readings from Last 1 Encounters:   03/24/17 5' 7.5\" (1.715 m)       Diet:    Active Orders   Diet    DIET DIABETIC CONSISTENT CARB Regular       Intake:   Patient Vitals for the past 100 hrs:   % Diet Eaten   03/28/17 1749 100 %   03/28/17 0902 90 %   03/27/17 1309 75 %   03/27/17 0840 100 %   03/26/17 1349 100 %   03/26/17 0805 20 %   03/25/17 1837 100 %       Education:  _x___Refer to Diabetes Education Record             ____Education not indicated at this time        Marbin Soto, RN, MS

## 2017-03-29 NOTE — PROGRESS NOTES
PT    New PT orders received and chart reviewed. Pt was evaluated by PT on 3/25/17 and discharged. Pt stated she did not need PT for in the hospital as she has been ambulating with her rollator in the hallways. Encouraged pt to continue to ambulate during her hospital stay; pt verbalized understanding.     Pal Field PT, DPT

## 2017-03-29 NOTE — PROGRESS NOTES
Pt assessed. AOx4. C/o dizziness. No distress noted on room air. Active bowel sounds. Lung sounds diminished at based expiratory wheezing.

## 2017-03-29 NOTE — PROGRESS NOTES
Bedside and Verbal shift change report given to [address] (oncoming nurse) by Sidney Muro RN (offgoing nurse). Report included the following information SBAR, Kardex and MAR.

## 2017-03-29 NOTE — PROGRESS NOTES
OT orders received and chart reviewed. Pt seen at bedside. Pt was evaluated 3/25/17 and discharged from OT caseload as pt was at baseline level. Pt reports she has no therapy needs and was noted to be ambulating in [place] with use of rollator.  Will d/c current order.     Denilson Pearl MS OTR/L

## 2017-03-30 LAB
GLUCOSE BLD STRIP.AUTO-MCNC: 102 MG/DL (ref 70–110)
GLUCOSE BLD STRIP.AUTO-MCNC: 141 MG/DL (ref 70–110)
GLUCOSE BLD STRIP.AUTO-MCNC: 251 MG/DL (ref 70–110)
GLUCOSE BLD STRIP.AUTO-MCNC: 315 MG/DL (ref 70–110)
MAGNESIUM SERPL-MCNC: 2.4 MG/DL (ref 1.8–2.4)

## 2017-03-30 PROCEDURE — 94760 N-INVAS EAR/PLS OXIMETRY 1: CPT

## 2017-03-30 PROCEDURE — 94640 AIRWAY INHALATION TREATMENT: CPT

## 2017-03-30 PROCEDURE — 74011250637 HC RX REV CODE- 250/637: Performed by: HOSPITALIST

## 2017-03-30 PROCEDURE — 74011250637 HC RX REV CODE- 250/637: Performed by: INTERNAL MEDICINE

## 2017-03-30 PROCEDURE — 83735 ASSAY OF MAGNESIUM: CPT | Performed by: HOSPITALIST

## 2017-03-30 PROCEDURE — 65270000029 HC RM PRIVATE

## 2017-03-30 PROCEDURE — 82962 GLUCOSE BLOOD TEST: CPT

## 2017-03-30 PROCEDURE — 74011000250 HC RX REV CODE- 250: Performed by: HOSPITALIST

## 2017-03-30 PROCEDURE — 74011250636 HC RX REV CODE- 250/636: Performed by: HOSPITALIST

## 2017-03-30 PROCEDURE — 36415 COLL VENOUS BLD VENIPUNCTURE: CPT | Performed by: HOSPITALIST

## 2017-03-30 PROCEDURE — 74011636637 HC RX REV CODE- 636/637: Performed by: HOSPITALIST

## 2017-03-30 RX ORDER — INSULIN LISPRO 100 [IU]/ML
3 INJECTION, SOLUTION INTRAVENOUS; SUBCUTANEOUS
Status: COMPLETED | OUTPATIENT
Start: 2017-03-30 | End: 2017-03-30

## 2017-03-30 RX ORDER — PREDNISONE 20 MG/1
40 TABLET ORAL
Status: DISCONTINUED | OUTPATIENT
Start: 2017-03-31 | End: 2017-03-31 | Stop reason: HOSPADM

## 2017-03-30 RX ADMIN — INSULIN LISPRO 3 UNITS: 100 INJECTION, SOLUTION INTRAVENOUS; SUBCUTANEOUS at 09:18

## 2017-03-30 RX ADMIN — BUDESONIDE 500 MCG: 0.5 INHALANT RESPIRATORY (INHALATION) at 07:28

## 2017-03-30 RX ADMIN — CARBIDOPA AND LEVODOPA 10 MG: 25; 100 TABLET, EXTENDED RELEASE ORAL at 15:14

## 2017-03-30 RX ADMIN — DIAZEPAM 5 MG: 5 TABLET ORAL at 01:04

## 2017-03-30 RX ADMIN — IPRATROPIUM BROMIDE AND ALBUTEROL SULFATE 3 ML: .5; 3 SOLUTION RESPIRATORY (INHALATION) at 07:11

## 2017-03-30 RX ADMIN — INSULIN LISPRO 3 UNITS: 100 INJECTION, SOLUTION INTRAVENOUS; SUBCUTANEOUS at 17:08

## 2017-03-30 RX ADMIN — PANTOPRAZOLE SODIUM 40 MG: 40 TABLET, DELAYED RELEASE ORAL at 06:44

## 2017-03-30 RX ADMIN — CARBIDOPA AND LEVODOPA 10 MG: 25; 100 TABLET, EXTENDED RELEASE ORAL at 09:19

## 2017-03-30 RX ADMIN — POLYETHYLENE GLYCOL 3350 17 G: 17 POWDER, FOR SOLUTION ORAL at 23:21

## 2017-03-30 RX ADMIN — INSULIN GLARGINE 14 UNITS: 100 INJECTION, SOLUTION SUBCUTANEOUS at 23:21

## 2017-03-30 RX ADMIN — Medication 10 ML: at 15:11

## 2017-03-30 RX ADMIN — ASPIRIN 81 MG 81 MG: 81 TABLET ORAL at 09:20

## 2017-03-30 RX ADMIN — ONDANSETRON 4 MG: 2 INJECTION INTRAMUSCULAR; INTRAVENOUS at 23:21

## 2017-03-30 RX ADMIN — LEVOFLOXACIN 500 MG: 5 INJECTION, SOLUTION INTRAVENOUS at 23:23

## 2017-03-30 RX ADMIN — ONDANSETRON 4 MG: 2 INJECTION INTRAMUSCULAR; INTRAVENOUS at 06:44

## 2017-03-30 RX ADMIN — HYDRALAZINE HYDROCHLORIDE 75 MG: 25 TABLET, FILM COATED ORAL at 09:19

## 2017-03-30 RX ADMIN — DOCUSATE SODIUM 100 MG: 100 CAPSULE, LIQUID FILLED ORAL at 23:22

## 2017-03-30 RX ADMIN — METHYLPREDNISOLONE SODIUM SUCCINATE 40 MG: 40 INJECTION, POWDER, FOR SOLUTION INTRAMUSCULAR; INTRAVENOUS at 23:21

## 2017-03-30 RX ADMIN — Medication 10 ML: at 23:23

## 2017-03-30 RX ADMIN — DIAZEPAM 5 MG: 5 TABLET ORAL at 10:20

## 2017-03-30 RX ADMIN — GUAIFENESIN 600 MG: 600 TABLET, EXTENDED RELEASE ORAL at 23:22

## 2017-03-30 RX ADMIN — AMITRIPTYLINE HYDROCHLORIDE 10 MG: 10 TABLET, FILM COATED ORAL at 23:22

## 2017-03-30 RX ADMIN — ZOLPIDEM TARTRATE 5 MG: 5 TABLET ORAL at 01:00

## 2017-03-30 RX ADMIN — INSULIN LISPRO 8 UNITS: 100 INJECTION, SOLUTION INTRAVENOUS; SUBCUTANEOUS at 17:07

## 2017-03-30 RX ADMIN — METHYLPREDNISOLONE SODIUM SUCCINATE 40 MG: 40 INJECTION, POWDER, FOR SOLUTION INTRAMUSCULAR; INTRAVENOUS at 09:19

## 2017-03-30 RX ADMIN — ARFORMOTEROL TARTRATE 15 MCG: 15 SOLUTION RESPIRATORY (INHALATION) at 07:28

## 2017-03-30 RX ADMIN — POLYETHYLENE GLYCOL 3350 17 G: 17 POWDER, FOR SOLUTION ORAL at 09:18

## 2017-03-30 RX ADMIN — CLOPIDOGREL BISULFATE 75 MG: 75 TABLET, FILM COATED ORAL at 09:20

## 2017-03-30 RX ADMIN — MAGNESIUM HYDROXIDE 30 ML: 400 SUSPENSION ORAL at 15:10

## 2017-03-30 RX ADMIN — DIAZEPAM 5 MG: 5 TABLET ORAL at 18:54

## 2017-03-30 RX ADMIN — BUDESONIDE 500 MCG: 0.5 INHALANT RESPIRATORY (INHALATION) at 19:57

## 2017-03-30 RX ADMIN — GUAIFENESIN 600 MG: 600 TABLET, EXTENDED RELEASE ORAL at 09:19

## 2017-03-30 RX ADMIN — CARBIDOPA AND LEVODOPA 10 MG: 25; 100 TABLET, EXTENDED RELEASE ORAL at 23:22

## 2017-03-30 RX ADMIN — PANTOPRAZOLE SODIUM 40 MG: 40 TABLET, DELAYED RELEASE ORAL at 17:12

## 2017-03-30 RX ADMIN — HYDRALAZINE HYDROCHLORIDE 75 MG: 25 TABLET, FILM COATED ORAL at 15:10

## 2017-03-30 RX ADMIN — DOCUSATE SODIUM 100 MG: 100 CAPSULE, LIQUID FILLED ORAL at 09:20

## 2017-03-30 RX ADMIN — ARFORMOTEROL TARTRATE 15 MCG: 15 SOLUTION RESPIRATORY (INHALATION) at 19:57

## 2017-03-30 RX ADMIN — ENOXAPARIN SODIUM 40 MG: 40 INJECTION SUBCUTANEOUS at 17:08

## 2017-03-30 RX ADMIN — LISINOPRIL 20 MG: 20 TABLET ORAL at 09:18

## 2017-03-30 RX ADMIN — Medication 118 ML: at 15:30

## 2017-03-30 RX ADMIN — HYDRALAZINE HYDROCHLORIDE 75 MG: 25 TABLET, FILM COATED ORAL at 23:21

## 2017-03-30 RX ADMIN — Medication 10 ML: at 06:49

## 2017-03-30 RX ADMIN — ONDANSETRON 4 MG: 2 INJECTION INTRAMUSCULAR; INTRAVENOUS at 10:38

## 2017-03-30 RX ADMIN — NIFEDIPINE 90 MG: 30 TABLET, FILM COATED, EXTENDED RELEASE ORAL at 09:18

## 2017-03-30 NOTE — PROGRESS NOTES
Shift Summary:  Patient required Valium 5mg po for anxiety. Patient states was upset due to IV out, and it took a long time for IV to be replaced. Also required Ambien 5 mg po for sleep. Zofran 4 mg IV x 2 given overnight. Kam, 3S manager made aware of patient's complaints this a.m.

## 2017-03-30 NOTE — PROGRESS NOTES
6981 Bedside report received from Adam Pavon RN.    5144 Shift uneventful. Pt is stable. Bedside report given to Brielle Manzano [facility].

## 2017-03-30 NOTE — ROUTINE PROCESS
Bedside and Verbal shift change report given to Tyshawn Hilton (oncoming nurse) by Matt Camarena (offgoing nurse). Report included the following information SBAR, Kardex and MAR.

## 2017-03-30 NOTE — ROUTINE PROCESS
Bedside and Verbal shift change report given to [address] (oncoming nurse) by Carina Myers (offgoing nurse). Report included the following information SBAR, Kardex, Intake/Output and MAR.

## 2017-03-30 NOTE — PROGRESS NOTES
Reviewed chart and discussed case during bedside rounds with nurse Tracy Wadsworth and Dr. Ondina Preciado. Pt has been evaluated by P.T. And no home health referral indicated, but recommendation for pt to receive outpatient P.T. Pt has rollator in her room and has declined receiving a rolling walker, but during rounds, pt agreed to let P.T. Bring a RW by for her to try. P.T. To notify CM if pt agrees to accept new RW.    Nurse stated pt wants CM to arrange stretcher ambulance home for her tomorrow. CM met with pt to explain that since pt is able to ambulate with her rollator, stretcher ambulance would likely deny her claim and she could incur cost, which could be as much as #300 to $500). Since pt is not being discharged today, CM provided pt with flyer re: the wheelchair [place] [id number] on the [employer], and [employer] application, and also recommended pt continue to make calls to family members this evening to see if anyone can provide her transport home tomorrow, as pt states she has several family members in the area. Pt stated she can afford to pay the ambulance. CM also discussed cab as an option to get home tomorrow. Will further discuss with pt on day of discharge. Pt has provided phone # [phone] and is insisting staff call her insurance to schedule stretcher transport home for tomorrow. CM has explained several times to pt that we will honor her request but she will likely incur the cost. Pt is not discharged today. Will discuss stretcher transport being scheduled tomorrow.

## 2017-03-30 NOTE — PROGRESS NOTES
Hospitalist Progress Note-critical care note     Patient: Favio Neely MRN: [id number]  CSN: [id number]    YOB: 1935  Age: [de-identified] y.o. Sex: female    DOA: 3/24/2017 LOS:  LOS: 6 days            Chief complaint: copd exacerbation . DM , constipation     Assessment/Plan         Patient Active Problem List   Diagnosis Code    DM (diabetes mellitus) ([facility] E11.9    HTN (hypertension) I10    Esophageal reflux K21.9    Urinary retention with incomplete bladder emptying R33.9    Unspecified constipation K59.00    Bronchitis J40    Acute exacerbation of COPD with asthma ([facility] J44.1, J45.901    Tobacco use Z72.0    Increased urinary frequency R35.0    Hypokalemia E87.6    COPD exacerbation (HCC) J44.1    Chest congestion R09.89    Constipation K59.00      1. Copd exacerbation with asthma, smoker   -improving slowly. continue  breathing, levoquin and pulmcort . Robtussin,  -weaning iv steroid and po steroid tomorrow. 2. DM type II  Continue  premeal insulin ,continue lantus,ssi, will decrease tomorrow   3. HTN, accelerated  Continue hydralazine for better control. 4. Hypokalemia  Resolved   5. Tobacco use  nicotine patch and education   6 reflux disease  PPI   7 chest congestion/chest discomfort   No episodes reported   ce negative. Ef wnl. No obvious  wall motion abnormalities identified in the views obtained. 8 constipation  Small bm yesterday, will continue BM    mom, colace and miralax , enema     Reported dizziness, will have ortho static bp. Subjective: not had a good night, due to too much disturbance    nurse : report nausea ,     Plan d/c home tomorrow   Review of systems:    General: No fevers or chills. Cardiovascular: No chest pain or pressure. No palpitations. Pulmonary: no  shortness of breath    Gastrointestinal: No nausea, vomiting.      Vital signs/Intake and Output:  Visit Vitals    /69 (BP 1 Location: Left arm, BP Patient Position: At rest)    Pulse 60    Temp 97.7 °F (36.5 °C)    Resp 18    Ht 5' 7.5\" (1.715 m)    Wt 74.3 kg (163 lb 14.4 oz)    SpO2 98%    BMI 25.29 kg/m2     Current Shift:     Last three shifts:  03/28 1901 - 03/30 0700  In: 340 [P.O.:240; I.V.:100]  Out: 0     Physical Exam:  General: WD, WN. Alert, cooperative, no acute distress    HEENT: NC, Atraumatic. PERRLA, anicteric sclerae. Lungs: No  Wheezing, Rhonchi and increased BS  Heart:  Regular  rhythm,  No murmur, No Rubs, No Gallops  Abdomen: Soft, Non distended, Non tender.  +Bowel sounds,   Extremities: No c/c/e  Psych:   Not anxious or agitated. Neurologic:  No acute neurological deficit. Labs: Results:       Chemistry No results for input(s): GLU, NA, K, CL, CO2, BUN, CREA, CA, AGAP, BUCR, TBIL, GPT, AP, TP, ALB, GLOB, AGRAT in the last 72 hours. CBC w/Diff No results for input(s): WBC, RBC, HGB, HCT, PLT, GRANS, LYMPH, EOS, HGBEXT, HCTEXT, PLTEXT, HGBEXT, HCTEXT, PLTEXT in the last 72 hours. Cardiac Enzymes No results for input(s): CPK, CKND1, LIBRADO in the last 72 hours. No lab exists for component: CKRMB, TROIP   Coagulation No results for input(s): PTP, INR, APTT in the last 72 hours. No lab exists for component: INREXT, INREXT    Lipid Panel No results found for: CHOL, CHOLPOCT, CHOLX, CHLST, CHOLV, [id number], HDL, LDL, NLDLCT, DLDL, LDLC, DLDLP, [id number], VLDLC, VLDL, TGL, TGLX, TRIGL, [id number], TRIGP, TGLPOCT, [id number], CHHD, CHHDX   BNP No results for input(s): BNPP in the last 72 hours. Liver Enzymes No results for input(s): TP, ALB, TBIL, AP, SGOT, GPT in the last 72 hours.     No lab exists for component: DBIL   Thyroid Studies No results found for: T4, T3U, TSH, TSHEXT, TSHEXT     Procedures/imaging: see electronic medical records for all procedures/Xrays and details which were not copied into this note but were reviewed prior to creation of Fernando Benz MD

## 2017-03-31 VITALS
HEIGHT: 68 IN | TEMPERATURE: 98.2 F | WEIGHT: 162.2 LBS | SYSTOLIC BLOOD PRESSURE: 143 MMHG | BODY MASS INDEX: 24.58 KG/M2 | HEART RATE: 91 BPM | RESPIRATION RATE: 16 BRPM | OXYGEN SATURATION: 100 % | DIASTOLIC BLOOD PRESSURE: 70 MMHG

## 2017-03-31 LAB
GLUCOSE BLD STRIP.AUTO-MCNC: 125 MG/DL (ref 70–110)
GLUCOSE BLD STRIP.AUTO-MCNC: 136 MG/DL (ref 70–110)
GLUCOSE BLD STRIP.AUTO-MCNC: 164 MG/DL (ref 70–110)
MAGNESIUM SERPL-MCNC: 2.5 MG/DL (ref 1.8–2.4)

## 2017-03-31 PROCEDURE — 36415 COLL VENOUS BLD VENIPUNCTURE: CPT | Performed by: HOSPITALIST

## 2017-03-31 PROCEDURE — 74011250637 HC RX REV CODE- 250/637: Performed by: HOSPITALIST

## 2017-03-31 PROCEDURE — 74011636637 HC RX REV CODE- 636/637: Performed by: HOSPITALIST

## 2017-03-31 PROCEDURE — 82962 GLUCOSE BLOOD TEST: CPT

## 2017-03-31 PROCEDURE — 74011250637 HC RX REV CODE- 250/637: Performed by: INTERNAL MEDICINE

## 2017-03-31 PROCEDURE — 83735 ASSAY OF MAGNESIUM: CPT | Performed by: HOSPITALIST

## 2017-03-31 PROCEDURE — 94640 AIRWAY INHALATION TREATMENT: CPT

## 2017-03-31 PROCEDURE — 74011250637 HC RX REV CODE- 250/637: Performed by: FAMILY MEDICINE

## 2017-03-31 PROCEDURE — 74011000250 HC RX REV CODE- 250: Performed by: HOSPITALIST

## 2017-03-31 PROCEDURE — 94760 N-INVAS EAR/PLS OXIMETRY 1: CPT

## 2017-03-31 PROCEDURE — 74011250636 HC RX REV CODE- 250/636: Performed by: HOSPITALIST

## 2017-03-31 RX ORDER — IPRATROPIUM BROMIDE AND ALBUTEROL SULFATE 2.5; .5 MG/3ML; MG/3ML
3 SOLUTION RESPIRATORY (INHALATION)
Qty: 30 ML | Refills: 0 | Status: SHIPPED | OUTPATIENT
Start: 2017-03-31

## 2017-03-31 RX ORDER — FLUTICASONE PROPIONATE AND SALMETEROL 250; 50 UG/1; UG/1
1 POWDER RESPIRATORY (INHALATION) EVERY 12 HOURS
Qty: 1 INHALER | Refills: 0 | Status: SHIPPED | OUTPATIENT
Start: 2017-03-31

## 2017-03-31 RX ORDER — PREDNISONE 10 MG/1
TABLET ORAL
Qty: 7 TAB | Refills: 0 | Status: SHIPPED | OUTPATIENT
Start: 2017-03-31 | End: 2017-07-07

## 2017-03-31 RX ORDER — NICOTINE 21-14-7MG
1 KIT TRANSDERMAL DAILY
Qty: 56 PATCH | Refills: 0 | Status: SHIPPED | OUTPATIENT
Start: 2017-03-31 | End: 2017-07-07

## 2017-03-31 RX ORDER — INSULIN GLARGINE 100 [IU]/ML
8 INJECTION, SOLUTION SUBCUTANEOUS
Status: DISCONTINUED | OUTPATIENT
Start: 2017-03-31 | End: 2017-03-31 | Stop reason: HOSPADM

## 2017-03-31 RX ORDER — GUAIFENESIN 600 MG/1
600 TABLET, EXTENDED RELEASE ORAL EVERY 12 HOURS
Qty: 20 TAB | Refills: 0 | Status: SHIPPED | OUTPATIENT
Start: 2017-03-31

## 2017-03-31 RX ADMIN — ARFORMOTEROL TARTRATE 15 MCG: 15 SOLUTION RESPIRATORY (INHALATION) at 07:01

## 2017-03-31 RX ADMIN — Medication 10 ML: at 08:30

## 2017-03-31 RX ADMIN — INSULIN LISPRO 2 UNITS: 100 INJECTION, SOLUTION INTRAVENOUS; SUBCUTANEOUS at 12:44

## 2017-03-31 RX ADMIN — BUDESONIDE 500 MCG: 0.5 INHALANT RESPIRATORY (INHALATION) at 07:01

## 2017-03-31 RX ADMIN — NIFEDIPINE 90 MG: 30 TABLET, FILM COATED, EXTENDED RELEASE ORAL at 08:30

## 2017-03-31 RX ADMIN — DOCUSATE SODIUM 100 MG: 100 CAPSULE, LIQUID FILLED ORAL at 08:29

## 2017-03-31 RX ADMIN — CLOPIDOGREL BISULFATE 75 MG: 75 TABLET, FILM COATED ORAL at 08:30

## 2017-03-31 RX ADMIN — CARBIDOPA AND LEVODOPA 10 MG: 25; 100 TABLET, EXTENDED RELEASE ORAL at 08:29

## 2017-03-31 RX ADMIN — PANTOPRAZOLE SODIUM 40 MG: 40 TABLET, DELAYED RELEASE ORAL at 16:01

## 2017-03-31 RX ADMIN — LISINOPRIL 20 MG: 20 TABLET ORAL at 08:30

## 2017-03-31 RX ADMIN — HYDRALAZINE HYDROCHLORIDE 75 MG: 25 TABLET, FILM COATED ORAL at 11:18

## 2017-03-31 RX ADMIN — ONDANSETRON HYDROCHLORIDE 4 MG: 4 TABLET, FILM COATED ORAL at 11:15

## 2017-03-31 RX ADMIN — PANTOPRAZOLE SODIUM 40 MG: 40 TABLET, DELAYED RELEASE ORAL at 06:53

## 2017-03-31 RX ADMIN — PREDNISONE 40 MG: 20 TABLET ORAL at 08:30

## 2017-03-31 RX ADMIN — ASPIRIN 81 MG 81 MG: 81 TABLET ORAL at 08:29

## 2017-03-31 RX ADMIN — HYDRALAZINE HYDROCHLORIDE 75 MG: 25 TABLET, FILM COATED ORAL at 16:01

## 2017-03-31 RX ADMIN — ONDANSETRON 4 MG: 2 INJECTION INTRAMUSCULAR; INTRAVENOUS at 06:53

## 2017-03-31 RX ADMIN — GUAIFENESIN 600 MG: 600 TABLET, EXTENDED RELEASE ORAL at 08:29

## 2017-03-31 RX ADMIN — DIAZEPAM 5 MG: 5 TABLET ORAL at 02:22

## 2017-03-31 RX ADMIN — DIAZEPAM 5 MG: 5 TABLET ORAL at 11:19

## 2017-03-31 RX ADMIN — CARBIDOPA AND LEVODOPA 10 MG: 25; 100 TABLET, EXTENDED RELEASE ORAL at 16:01

## 2017-03-31 NOTE — PROGRESS NOTES
Discussed case with Dr. Sae Moreno who stated pt is medically cleared to return home today; pt continuing to request stretcher transport and unit clerk arranged stretcher for pt. (CM provided pt yesterday with other options for transport home). CM explained pt may incur the cost for the stretcher transport, and pt expressed an understanding of this. See AVS for pt's medical follow-up plans.

## 2017-03-31 NOTE — PROGRESS NOTES
Dual AVS reviewed with Hardeep Callahan. All medications reviewed individually with patient. Opportunities for questions and concerns provided. Patient discharged via (mode of transport ie. Car, ambulance or air transport) [address] [facility] [address] [facility] Patient's arm band appropriately discarded.

## 2017-03-31 NOTE — PROGRESS NOTES
Patient alert and awake sitting on side of bed eating breakfast with no c/o pain or distress. Assessment complete and due meds given as ordered. Call light within reach, safety and comfort measures are in place.

## 2017-03-31 NOTE — DISCHARGE SUMMARY
Discharge Summary    Patient: Delmy Hernández MRN: [id number]  CSN: [id number]    YOB: 1935  Age: [de-identified] y.o. Sex: female    DOA: 3/24/2017 LOS:  LOS: 7 days   Discharge Date:      Primary Care Provider:  Azalea Garner MD    Admission Diagnoses: COPD exacerbation ([facility]  Hypokalemia  COPD exacerbation ([facility]  Hypokalemia    Discharge Diagnoses:    Patient Active Problem List   Diagnosis Code    DM (diabetes mellitus) ([facility] E11.9    HTN (hypertension) I10    Esophageal reflux K21.9    Urinary retention with incomplete bladder emptying R33.9    Unspecified constipation K59.00    Bronchitis J40    Acute exacerbation of COPD with asthma ([facility] J44.1, J45.901    Tobacco use Z72.0    Increased urinary frequency R35.0    Hypokalemia E87.6    COPD exacerbation (HCC) J44.1    Chest congestion R09.89    Constipation K59.00       Discharge Condition: Stable    Discharge Medications:     Current Discharge Medication List      START taking these medications    Details   guaiFENesin ER (MUCINEX) 600 mg ER tablet Take 1 Tab by mouth every twelve (12) hours. Qty: 20 Tab, Refills: 0      fluticasone-salmeterol (ADVAIR DISKUS) 250-50 mcg/dose diskus inhaler Take 1 Puff by inhalation every twelve (12) hours. Qty: 1 Inhaler, Refills: 0      predniSONE (DELTASONE) 10 mg tablet Take two tabs for 2 days, one tab for 2 days, 0.5 tab for 2 days  Qty: 7 Tab, Refills: 0      Nicotine 21-14-7 mg/24 hr ptds 1 Patch by TransDERmal route daily. Qty: 56 Patch, Refills: 0         CONTINUE these medications which have CHANGED    Details   albuterol-ipratropium (DUO-NEB) 2.5 mg-0.5 mg/3 ml nebu 3 mL by Nebulization route every four (4) hours as needed. Qty: 30 mL, Refills: 0         CONTINUE these medications which have NOT CHANGED    Details   losartan (COZAAR) 25 mg tablet Take 25 mg by mouth daily. Indications: hypertension      busPIRone (BUSPAR) 10 mg tablet Take 10 mg by mouth three (3) times daily. cyclobenzaprine (FLEXERIL) 5 mg tablet Take 5 mg by mouth three (3) times daily as needed for Muscle Spasm(s). furosemide (LASIX) 40 mg tablet Take  by mouth Every Mon, Wed and Sat. Indications: Edema      HYDROcodone-acetaminophen (NORCO) 5-325 mg per tablet Take 1 Tab by mouth every four (4) hours as needed for Pain. Max Daily Amount: 6 Tabs. Qty: 15 Tab, Refills: 0      clopidogrel (PLAVIX) 75 mg tablet Take 1 Tab by mouth daily. Qty: 30 Tab, Refills: 0      NIFEdipine ER (PROCARDIA XL) 90 mg ER tablet Take 1 Tab by mouth daily. Qty: 30 Tab, Refills: 0      zolpidem (AMBIEN) 5 mg tablet Take 1 Tab by mouth nightly as needed for Sleep. Max Daily Amount: 5 mg. Qty: 5 Tab, Refills: 0      amitriptyline (ELAVIL) 10 mg tablet Take 1 tablet by mouth nightly. Qty: 30 tablet, Refills: 0      glimepiride (AMARYL) 1 mg tablet Take 1 tablet by mouth Daily (before breakfast). Qty: 30 tablet, Refills: 0      aspirin 81 mg chewable tablet Take 1 Tab by mouth daily. Qty: 1 Tab, Refills: 1      diazepam (VALIUM) 5 mg tablet Take 1 Tab by mouth every eight (8) hours as needed. Max Daily Amount: 15 mg.  Qty: 20 Tab, Refills: 0      albuterol (PROVENTIL VENTOLIN) 2.5 mg /3 mL (0.083 %) nebulizer solution 3 mL by Nebulization route every four (4) hours as needed for Wheezing. Qty: 10 Package, Refills: 0      hydrALAZINE (APRESOLINE) 50 mg tablet Take 1 Tab by mouth three (3) times daily. Qty: 90 Tab, Refills: 0      polyethylene glycol (MIRALAX) 17 gram packet Take 1 Packet by mouth daily as needed for Other (constipation). Qty: 30 Each, Refills: 0      lisinopril (PRINIVIL, ZESTRIL) 20 mg tablet Take 1 Tab by mouth daily. Qty: 30 Tab, Refills: 0      glucose 4 gram chewable tablet Take 4 tablets by mouth as needed.   Qty: 30 tablet, Refills: 0         STOP taking these medications       POTASSIUM CHLORIDE SR 10 MEQ TAB Comments:   Reason for Stopping:               Procedures : none     Consults: None      PHYSICAL EXAM   Visit Vitals    /68    Pulse 81    Temp 97.4 °F (36.3 °C)    Resp 16    Ht 5' 7.5\" (1.715 m)    Wt 73.6 kg (162 lb 3.2 oz)    SpO2 98%    BMI 25.03 kg/m2     General: Awake, cooperative, no acute distress    HEENT: NC, Atraumatic. PERRLA, EOMI. Anicteric sclerae. Lungs:  CTA Bilaterally. No Wheezing/Rhonchi/Rales. Heart:  Regular  rhythm,  No murmur, No Rubs, No Gallops  Abdomen: Soft, Non distended, Non tender. +Bowel sounds,   Extremities: No c/c/e  Psych:   Not anxious or agitated. Neurologic:  No acute neurological deficits. Admission HPI :   Mayra Woods is a [de-identified] y.o. female who hx of copd, dm, htn came to ED due sob for two days. She reported that her sob worsening today with wheezing and cough. She used breathing tx at home, not improving. She also reported \"chest congestion\", pain from left shoulder to left rib, some times down to esophageal. The pain worsening while moving Left shoulder-f/u with pcm for the problem. In ER, 2 ce set negative, iv steroid and iv mg. k was replaced, cxr:No acute pulmonary process identified     Denies any slurred speech/headache/n/v/blurred vission/d/c/palpitation/gait change/bleeding. She is a  Smoker, no  drug use. Drink alcohol, but not daily drinker. Hospital Course :   Since she was admitted, iv steroid was started wit iv abx /breathing tx. She is smoker and her symptoms improved slowly. Insulin was adjusted due to glucose level and changing of steroid. Nicotine patchy was administrated for smoking cessation echo was done for her chest congestion/discomfort second to cough. Echo: Ef wnl. No obvious wall motion abnormalities identified in the views obtained. We also controlled her HTN and constipation resolved. She remained hemodynamic stable.      Activity: Activity as tolerated    Diet: Diabetic Diet    Follow-up: pcm and pulmonology     Disposition: home     Minutes spent on discharge: 60 min       Labs: Results:       Chemistry No results for input(s): GLU, NA, K, CL, CO2, BUN, CREA, CA, AGAP, BUCR, TBIL, GPT, AP, TP, ALB, GLOB, AGRAT in the last 72 hours. CBC w/Diff No results for input(s): WBC, RBC, HGB, HCT, PLT, GRANS, LYMPH, EOS, HGBEXT, HCTEXT, PLTEXT in the last 72 hours. Cardiac Enzymes No results for input(s): CPK, CKND1, LIBRADO in the last 72 hours. No lab exists for component: CKRMB, TROIP   Coagulation No results for input(s): PTP, INR, APTT in the last 72 hours. No lab exists for component: INREXT    Lipid Panel No results found for: CHOL, CHOLPOCT, CHOLX, CHLST, CHOLV, [id number], HDL, LDL, NLDLCT, DLDL, LDLC, DLDLP, [id number], VLDLC, VLDL, TGL, TGLX, TRIGL, [id number], TRIGP, TGLPOCT, [id number], CHHD, CHHDX   BNP No results for input(s): BNPP in the last 72 hours. Liver Enzymes No results for input(s): TP, ALB, TBIL, AP, SGOT, GPT in the last 72 hours. No lab exists for component: DBIL   Thyroid Studies No results found for: T4, T3U, TSH, TSHEXT         Significant Diagnostic Studies: Xr Chest Pa Lat    Result Date: 3/26/2017  EXAM: Frontal and lateral views of the chest INDICATION: Cough COMPARISON: 03/24/2017 _______________ FINDINGS: Cardiac silhouette is normal in size. Stable mediastinal contours with dense atherosclerosis of the aorta. Normal pulmonary vasculature. No confluent airspace opacity. Sharp costophrenic sulci. No pneumothoraces. There is incompletely visualized posterior spinal fusion hardware. _______________     IMPRESSION: No acute cardiopulmonary findings. Xr Chest Port    Result Date: 3/24/2017  EXAM: One-view chest CLINICAL HISTORY: Chest Pain , COMPARISON: None FINDINGS: Frontal view of the chest demonstrate clear lungs. Cardiac silhouette is normal in size and contour. No acute bony or soft tissue abnormality. IMPRESSION: No acute pulmonary process identified.              [facility]     CC: Doris Gabriel MD

## 2017-03-31 NOTE — PROGRESS NOTES
NUTRITION SCREENING    Recommendations: Continue Consistent Carb diet    RD ASSESSMENT/PLAN:     Diet:  Consistent Carb Height: 5' 7.5\" (171.5 cm)     Food Allergies:NKFA Weight: 73.6 kg (162 lb 3.2 oz)    PO Intake:  Patient Vitals for the past 100 hrs:   % Diet Eaten   03/31/17 1028 10 %   03/30/17 1830 100 %   03/30/17 1008 100 %   03/29/17 1838 100 %   03/28/17 1749 100 %   03/28/17 0902 90 %   03/27/17 1309 75 %    BMI: 25 kg/m^2 is  overweight (25.0%-29.9% BMI)      PMH: DM, DVT, GERD, HTN, anxiety, depression, COPD    Current Hospital Problems: COPD exacerbation, HTN     Chart reviewed, overall pt with adequate PO intake. Decreased intake of breakfast meal today only related to nausea/constipation- MD added MOM, colace, miralax, enema. Nutrition intervention not currently indicated. Pt is not at nutritional risk at this time. Will rescreen per policy.      REASON FOR ASSESSMENT:   []  RN Referral:    [] MST score >/=2  Malnutrition Screening Tool (MST):  Recently Lost Weight Without Trying: Yes  If Yes, How Much Weight Loss: 2-13 lbs     MST Score: 1    [] Enteral/Parenteral Nutrition PTA   [] Pregnant (Not in Labor):  [] Gestational DM     [] Multigestation   [] Pressure Ulcer/Wound Care needs  [] Positive Nutrition Screen:  [] BMI <18.5  [] NPO/Clear Liquid Diet > 5 days (>3 days in ICU)  [] New Order for TPN  [x] LOS  [] ICU admission      Kaz Hartman, [address]  Pager: [phone]

## 2017-03-31 NOTE — PROGRESS NOTES
Shift summary: No acute events overnight. Patient A/Ox4. Denied pain. Bilateral lungs clear. Some nausea, zofran given.      Patient Vitals for the past 12 hrs:   Temp Pulse Resp BP SpO2   03/31/17 0753 97.2 °F (36.2 °C) 63 16 143/60 98 %   03/31/17 0701 - - - - 97 %   03/31/17 0410 98.1 °F (36.7 °C) 87 16 139/76 99 %   03/30/17 2317 98.2 °F (36.8 °C) 71 18 160/81 100 %

## 2017-03-31 NOTE — ROUTINE PROCESS
Bedside and Verbal shift change report given to Rika Louis RN (oncoming nurse) by Gala Yun RN   (offgoing nurse). Report included the following information SBAR, Kardex and MAR.

## 2017-03-31 NOTE — ROUTINE PROCESS
Bedside and Verbal shift change report given to Sadaf Veloz (oncoming nurse) by Kate Valverde (offgoing nurse). Report included the following information SBAR, Kardex and MAR.

## 2017-07-07 ENCOUNTER — HOSPITAL ENCOUNTER (EMERGENCY)
Age: 82
Discharge: HOME | End: 2017-07-07
Attending: EMERGENCY MEDICINE | Admitting: EMERGENCY MEDICINE
Payer: MEDICARE

## 2017-07-07 ENCOUNTER — APPOINTMENT (OUTPATIENT)
Dept: GENERAL RADIOLOGY | Age: 82
End: 2017-07-07
Attending: PHYSICIAN ASSISTANT
Payer: MEDICARE

## 2017-07-07 VITALS
BODY MASS INDEX: 22.73 KG/M2 | TEMPERATURE: 98.6 F | RESPIRATION RATE: 16 BRPM | HEIGHT: 68 IN | HEART RATE: 98 BPM | WEIGHT: 150 LBS | OXYGEN SATURATION: 93 % | SYSTOLIC BLOOD PRESSURE: 151 MMHG | DIASTOLIC BLOOD PRESSURE: 95 MMHG

## 2017-07-07 DIAGNOSIS — M54.50 ACUTE BILATERAL LOW BACK PAIN WITHOUT SCIATICA: Primary | ICD-10-CM

## 2017-07-07 DIAGNOSIS — N39.0 UTI (URINARY TRACT INFECTION), UNCOMPLICATED: ICD-10-CM

## 2017-07-07 LAB
ALBUMIN SERPL BCP-MCNC: 3.7 G/DL (ref 3.4–5)
ALBUMIN/GLOB SERPL: 1 {RATIO} (ref 0.8–1.7)
ALP SERPL-CCNC: 100 U/L (ref 45–117)
ALT SERPL-CCNC: 21 U/L (ref 13–56)
ANION GAP BLD CALC-SCNC: 9 MMOL/L (ref 3–18)
APPEARANCE UR: CLEAR
AST SERPL W P-5'-P-CCNC: 18 U/L (ref 15–37)
BACTERIA URNS QL MICRO: ABNORMAL /HPF
BASOPHILS # BLD AUTO: 0 K/UL (ref 0–0.06)
BASOPHILS # BLD: 0 % (ref 0–2)
BILIRUB SERPL-MCNC: 0.4 MG/DL (ref 0.2–1)
BILIRUB UR QL: NEGATIVE
BUN SERPL-MCNC: 25 MG/DL (ref 7–18)
BUN/CREAT SERPL: 20 (ref 12–20)
CALCIUM SERPL-MCNC: 9.4 MG/DL (ref 8.5–10.1)
CHLORIDE SERPL-SCNC: 103 MMOL/L (ref 100–108)
CO2 SERPL-SCNC: 31 MMOL/L (ref 21–32)
COLOR UR: YELLOW
CREAT SERPL-MCNC: 1.22 MG/DL (ref 0.6–1.3)
DIFFERENTIAL METHOD BLD: NORMAL
EOSINOPHIL # BLD: 0.2 K/UL (ref 0–0.4)
EOSINOPHIL NFR BLD: 4 % (ref 0–5)
EPITH CASTS URNS QL MICRO: ABNORMAL /LPF (ref 0–5)
ERYTHROCYTE [DISTWIDTH] IN BLOOD BY AUTOMATED COUNT: 12.5 % (ref 11.6–14.5)
GLOBULIN SER CALC-MCNC: 3.6 G/DL (ref 2–4)
GLUCOSE SERPL-MCNC: 104 MG/DL (ref 74–99)
GLUCOSE UR STRIP.AUTO-MCNC: NEGATIVE MG/DL
HCT VFR BLD AUTO: 42.2 % (ref 35–45)
HGB BLD-MCNC: 14.6 G/DL (ref 12–16)
HGB UR QL STRIP: NEGATIVE
KETONES UR QL STRIP.AUTO: NEGATIVE MG/DL
LEUKOCYTE ESTERASE UR QL STRIP.AUTO: ABNORMAL
LYMPHOCYTES # BLD AUTO: 43 % (ref 21–52)
LYMPHOCYTES # BLD: 2.7 K/UL (ref 0.9–3.6)
MCH RBC QN AUTO: 29.9 PG (ref 24–34)
MCHC RBC AUTO-ENTMCNC: 34.6 G/DL (ref 31–37)
MCV RBC AUTO: 86.3 FL (ref 74–97)
MONOCYTES # BLD: 0.3 K/UL (ref 0.05–1.2)
MONOCYTES NFR BLD AUTO: 5 % (ref 3–10)
NEUTS SEG # BLD: 2.9 K/UL (ref 1.8–8)
NEUTS SEG NFR BLD AUTO: 48 % (ref 40–73)
NITRITE UR QL STRIP.AUTO: NEGATIVE
PH UR STRIP: 6.5 [PH] (ref 5–8)
PLATELET # BLD AUTO: 309 K/UL (ref 135–420)
PMV BLD AUTO: 9.3 FL (ref 9.2–11.8)
POTASSIUM SERPL-SCNC: 3.8 MMOL/L (ref 3.5–5.5)
PROT SERPL-MCNC: 7.3 G/DL (ref 6.4–8.2)
PROT UR STRIP-MCNC: NEGATIVE MG/DL
RBC # BLD AUTO: 4.89 M/UL (ref 4.2–5.3)
RBC #/AREA URNS HPF: NEGATIVE /HPF (ref 0–5)
SODIUM SERPL-SCNC: 143 MMOL/L (ref 136–145)
SP GR UR REFRACTOMETRY: 1.01 (ref 1–1.03)
UROBILINOGEN UR QL STRIP.AUTO: 0.2 EU/DL (ref 0.2–1)
WBC # BLD AUTO: 6.2 K/UL (ref 4.6–13.2)
WBC URNS QL MICRO: ABNORMAL /HPF (ref 0–5)

## 2017-07-07 PROCEDURE — 99283 EMERGENCY DEPT VISIT LOW MDM: CPT

## 2017-07-07 PROCEDURE — 80053 COMPREHEN METABOLIC PANEL: CPT | Performed by: PHYSICIAN ASSISTANT

## 2017-07-07 PROCEDURE — 87086 URINE CULTURE/COLONY COUNT: CPT | Performed by: PHYSICIAN ASSISTANT

## 2017-07-07 PROCEDURE — 81001 URINALYSIS AUTO W/SCOPE: CPT | Performed by: PHYSICIAN ASSISTANT

## 2017-07-07 PROCEDURE — 72110 X-RAY EXAM L-2 SPINE 4/>VWS: CPT

## 2017-07-07 PROCEDURE — 74011250637 HC RX REV CODE- 250/637: Performed by: PHYSICIAN ASSISTANT

## 2017-07-07 PROCEDURE — 85025 COMPLETE CBC W/AUTO DIFF WBC: CPT | Performed by: PHYSICIAN ASSISTANT

## 2017-07-07 RX ORDER — CEPHALEXIN 500 MG/1
500 CAPSULE ORAL 2 TIMES DAILY
Qty: 14 CAP | Refills: 0 | Status: SHIPPED | OUTPATIENT
Start: 2017-07-07 | End: 2017-07-14

## 2017-07-07 RX ORDER — CEPHALEXIN 250 MG/1
500 CAPSULE ORAL
Status: COMPLETED | OUTPATIENT
Start: 2017-07-07 | End: 2017-07-07

## 2017-07-07 RX ADMIN — CEPHALEXIN 500 MG: 250 CAPSULE ORAL at 19:44

## 2017-07-07 NOTE — ED NOTES
Pt reports unable to given urine sample at this time and given soda per request, also given pt menu and informed on how to order meal tray. Pt lying on stretcher, reports same pain at this time, no distress noted, Pt call bell within reach, denies needing bathroom,  room safety check completed, informed of status, awaiting results, will continue to monitor.

## 2017-07-07 NOTE — DISCHARGE INSTRUCTIONS
Urinary Tract Infection in Women: Care Instructions  Your Care Instructions    A urinary tract infection, or UTI, is a general term for an infection anywhere between the kidneys and the urethra (where urine comes out). Most UTIs are bladder infections. They often cause pain or burning when you urinate. UTIs are caused by bacteria and can be cured with antibiotics. Be sure to complete your treatment so that the infection goes away. Follow-up care is a key part of your treatment and safety. Be sure to make and go to all appointments, and call your doctor if you are having problems. It's also a good idea to know your test results and keep a list of the medicines you take. How can you care for yourself at home? · Take your antibiotics as directed. Do not stop taking them just because you feel better. You need to take the full course of antibiotics. · Drink extra water and other fluids for the next day or two. This may help wash out the bacteria that are causing the infection. (If you have kidney, heart, or liver disease and have to limit fluids, talk with your doctor before you increase your fluid intake.)  · Avoid drinks that are carbonated or have caffeine. They can irritate the bladder. · Urinate often. Try to empty your bladder each time. · To relieve pain, take a hot bath or lay a heating pad set on low over your lower belly or genital area. Never go to sleep with a heating pad in place. To prevent UTIs  · Drink plenty of water each day. This helps you urinate often, which clears bacteria from your system. (If you have kidney, heart, or liver disease and have to limit fluids, talk with your doctor before you increase your fluid intake.)  · Urinate when you need to. · Urinate right after you have sex. · Change sanitary pads often. · Avoid douches, bubble baths, feminine hygiene sprays, and other feminine hygiene products that have deodorants.   · After going to the bathroom, wipe from front to back.  When should you call for help? Call your doctor now or seek immediate medical care if:  · Symptoms such as fever, chills, nausea, or vomiting get worse or appear for the first time. · You have new pain in your back just below your rib cage. This is called flank pain. · There is new blood or pus in your urine. · You have any problems with your antibiotic medicine. Watch closely for changes in your health, and be sure to contact your doctor if:  · You are not getting better after taking an antibiotic for 2 days. · Your symptoms go away but then come back. Where can you learn more? Go to http://korin-lauren.info/. Enter O886 in the search box to learn more about \"Urinary Tract Infection in Women: Care Instructions. \"  Current as of: November 28, 2016  Content Version: 11.3  © 6480-0253 [email or web address]. Care instructions adapted under license by [employer] (which disclaims liability or warranty for this information). If you have questions about a medical condition or this instruction, always ask your healthcare professional. [place] [address] any warranty or liability for your use of this information. Low Back Pain: Exercises  Your Care Instructions  Here are some examples of typical rehabilitation exercises for your condition. Start each exercise slowly. Ease off the exercise if you start to have pain. Your doctor or physical therapist will tell you when you can start these exercises and which ones will work best for you. How to do the exercises  Press-up    1. Lie on your stomach, supporting your body with your forearms. 2. Press your elbows down into the floor to raise your upper back. As you do this, relax your stomach muscles and allow your back to arch without using your back muscles. As your press up, do not let your hips or pelvis come off the floor. 3. Hold for 15 to 30 seconds, then relax. 4. Repeat 2 to 4 times.   Alternate arm and leg (bird dog) exercise    Note: Do this exercise slowly. Try to keep your body straight at all times, and do not let one hip drop lower than the other. 1. Start on the floor, on your hands and knees. 2. Tighten your belly muscles. 3. Raise one leg off the floor, and hold it straight out behind you. Be careful not to let your hip drop down, because that will twist your trunk. 4. Hold for about 6 seconds, then lower your leg and switch to the other leg. 5. Repeat 8 to 12 times on each leg. 6. Over time, work up to holding for 10 to 30 seconds each time. 7. If you feel stable and secure with your leg raised, try raising the opposite arm straight out in front of you at the same time. Knee-to-chest exercise    1. Lie on your back with your knees bent and your feet flat on the floor. 2. Bring one knee to your chest, keeping the other foot flat on the floor (or keeping the other leg straight, whichever feels better on your lower back). 3. Keep your lower back pressed to the floor. Hold for at least 15 to 30 seconds. 4. Relax, and lower the knee to the starting position. 5. Repeat with the other leg. Repeat 2 to 4 times with each leg. 6. To get more stretch, put your other leg flat on the floor while pulling your knee to your chest.  Curl-ups    1. Lie on the floor on your back with your knees bent at a 90-degree angle. Your feet should be flat on the floor, about 12 inches from your buttocks. 2. Cross your arms over your chest. If this bothers your neck, try putting your hands behind your neck (not your head), with your elbows spread apart. 3. Slowly tighten your belly muscles and raise your shoulder blades off the floor. 4. Keep your head in line with your body, and do not press your chin to your chest.  5. Hold this position for 1 or 2 seconds, then slowly lower yourself back down to the floor. 6. Repeat 8 to 12 times. Pelvic tilt exercise    1. Lie on your back with your knees bent. 2.  \"Brace\" your stomach. This means to tighten your muscles by pulling in and imagining your belly button moving toward your spine. You should feel like your back is pressing to the floor and your hips and pelvis are rocking back. 3. Hold for about 6 seconds while you breathe smoothly. 4. Repeat 8 to 12 times. Heel dig bridging    1. Lie on your back with both knees bent and your ankles bent so that only your heels are digging into the floor. Your knees should be bent about 90 degrees. 2. Then push your heels into the floor, squeeze your buttocks, and lift your hips off the floor until your shoulders, hips, and knees are all in a straight line. 3. Hold for about 6 seconds as you continue to breathe normally, and then slowly lower your hips back down to the floor and rest for up to 10 seconds. 4. Do 8 to 12 repetitions. Hamstring stretch in doorway    1. Lie on your back in a doorway, with one leg through the open door. 2. Slide your leg up the wall to straighten your knee. You should feel a gentle stretch down the back of your leg. 3. Hold the stretch for at least 15 to 30 seconds. Do not arch your back, point your toes, or bend either knee. Keep one heel touching the floor and the other heel touching the wall. 4. Repeat with your other leg. 5. Do 2 to 4 times for each leg. Hip flexor stretch    1. Kneel on the floor with one knee bent and one leg behind you. Place your forward knee over your foot. Keep your other knee touching the floor. 2. Slowly push your hips forward until you feel a stretch in the upper thigh of your rear leg. 3. Hold the stretch for at least 15 to 30 seconds. Repeat with your other leg. 4. Do 2 to 4 times on each side. Wall sit    1. Stand with your back 10 to 12 inches away from a wall. 2. Lean into the wall until your back is flat against it. 3. Slowly slide down until your knees are slightly bent, pressing your lower back into the wall.   4. Hold for about 6 seconds, then slide back up the wall. 5. Repeat 8 to 12 times. Follow-up care is a key part of your treatment and safety. Be sure to make and go to all appointments, and call your doctor if you are having problems. It's also a good idea to know your test results and keep a list of the medicines you take. Where can you learn more? Go to http://korin-lauren.info/. Enter G153 in the search box to learn more about \"Low Back Pain: Exercises. \"  Current as of: March 21, 2017  Content Version: 11.3  © 4967-6324 [employer]. Care instructions adapted under license by [facility] (which disclaims liability or warranty for this information). If you have questions about a medical condition or this instruction, always ask your healthcare professional. [address] any warranty or liability for your use of this information.

## 2017-07-07 NOTE — ED NOTES
I have reviewed discharge instructions with the patient. The patient verbalized understanding. Patient armband removed and shredded, script x 1 sent to pharmacy and verified with pt, pt called cab and assisted to lobby via wheelchair.

## 2017-07-07 NOTE — ED NOTES
Pt up to bedside commode and attempted to provide urine sample without success, pt is aware of need for urine sample and provided with water as requested. Pt states \"I don't want to see a PA, I'd rather see a Dr. But that's OK\".

## 2017-07-07 NOTE — ED PROVIDER NOTES
HPI Comments: 4:08 PM    Jose Umaña is a [de-identified] y.o. female with pertinent PMHx of DM, HTN, DJD, Lumbar orthopaedic surgery, hysterectomy and chronic pain presenting via EMS to the ED c/o right lower back pain, which radiates to the left side of her back and down her left leg pain x 3 days. Pt states that she believes her right hip is slightly swollen. Pt denies any injury or fall that could be causing her pain. Pt denies any increase in difficulty ambulating, but notes that she uses a walker at baseline. Pt denies any history of similar pain. Pt states that she was seen in an ED yesterday for similar pain, but denies having any tests. She was given 2 [place] while in the ED, which helped with her pain only temporarily. Pt states that she was given a prescription for Norco, which she did not fill. Pt states that she took ASA tonight, but with no relief. Pt states that she has a caregiver at home, but states that she is away for the next two days. Pt denies any recent follow up with her Orthopaedic physician. Pt specifically denies any abdominal pain, chest pain, urinary sxs, numbness/tingling or SOB. PCP: Eleni Merlin, MD  Orthopaedics: Gissel Elizondo M.D. Social Hx: + tobacco use, + social alcohol use, - illicit drug use    There are no other complaints, changes, or physical findings at this time. The history is provided by the patient. No  was used.         Past Medical History:   Diagnosis Date    Anxiety     Arthritis     DJD    Arthropathy     Asthma     C. difficile diarrhea     [facility] obstructive pulmonary disease[facility] [address]     Depression     Depression     Diabetes (HCC)     [facility]     Gastrointestinal disorder     GERD (gastroesophageal reflux disease)     High cholesterol     Hypertension     Pain, chronic     Pneumonia     SOB (shortness of breath)        Past Surgical History:   Procedure Laterality Date    HX CHOLECYSTECTOMY  HX CORONARY STENT PLACEMENT      HX GYN      c section    HX HYSTERECTOMY      HX ORTHOPAEDIC      back surgery    HX OTHER SURGICAL      laminectomy    NEUROLOGICAL PROCEDURE UNLISTED      back surgery         History reviewed. No pertinent family history. Social History     Social History    Marital status:      Spouse name: N/A    Number of children: N/A    Years of education: N/A     Occupational History    Not on file. Social History Main Topics    Smoking status: Current Every Day Smoker     Packs/day: 0.50    Smokeless tobacco: Never Used    Alcohol use Yes      Comment: social    Drug use: No    Sexual activity: Not on file     Other Topics Concern    Not on file     Social History Narrative         ALLERGIES: Codeine; Dilaudid [hydromorphone (bulk)]; Percocet [oxycodone-acetaminophen]; and Sulfa (sulfonamide antibiotics)    Review of Systems   Respiratory: Negative for shortness of breath. Cardiovascular: Negative for chest pain. Gastrointestinal: Negative for abdominal pain. Genitourinary: Negative. Musculoskeletal: Positive for back pain (right lower, radiating to left lower back) and myalgias (left leg). + left hip swelling   Neurological: Negative for numbness. All other systems reviewed and are negative. Vitals:    07/07/17 1557 07/07/17 1600 07/07/17 1630   BP: (!) 157/98 148/83 (!) 151/95   Pulse: 98     Resp: 16     Temp: 98.6 °F (37 °C)     SpO2: 97% 100% 93%   Weight: 68 kg (150 lb)     Height: 5' 7.5\" (1.715 m)              Physical Exam   Constitutional: She is oriented to person, place, and time. She appears well-developed and well-nourished. No distress. HENT:   Head: Normocephalic and atraumatic. Eyes: Conjunctivae and EOM are normal. Pupils are equal, round, and reactive to light. Neck: Normal range of motion. Neck supple. Cardiovascular: Normal rate and regular rhythm.     Pulmonary/Chest: Effort normal and breath sounds normal.   Abdominal: Soft. Bowel sounds are normal. There is no tenderness. Musculoskeletal: Normal range of motion. She exhibits no edema. Thoracic back: Normal.        Lumbar back: She exhibits tenderness and pain. She exhibits normal range of motion. Back:    Sensation normal BLE's; no edema or sign of trauma BLE's; seems to move all 4 extremities easily during H&P; distal pulses normal    Neurological: She is alert and oriented to person, place, and time. Skin: Skin is warm and dry. No rash noted. No erythema. Psychiatric: She has a normal mood and affect. Her behavior is normal.   Nursing note and vitals reviewed. RESULTS:    CARDIAC MONITOR NOTE:  Cardiac Rhythm: NSR  Rate: 98 bpm     PULSE OXIMETRY NOTE:  Pulse-ox is 99% on RA  Interpretation: NML       XR SPINE LUMB MIN 4 V    (Results Pending)      6:40 PM  RADIOLOGY FINDINGS  Lumbar X-ray shows severe degenerative changes and hardware noted  Pending review by Radiologist  Recorded by Lance Ledezma ED Scribe, as dictated by Norris Packer PA-C.      Labs Reviewed   METABOLIC PANEL, COMPREHENSIVE - Abnormal; Notable for the following:        Result Value    Glucose 104 (*)     BUN 25 (*)     GFR est AA 51 (*)     GFR est non-AA 42 (*)     All other components within normal limits   URINALYSIS W/ RFLX MICROSCOPIC - Abnormal; Notable for the following:     Leukocyte Esterase MODERATE (*)     All other components within normal limits   URINE MICROSCOPIC ONLY - Abnormal; Notable for the following:     Bacteria FEW (*)     All other components within normal limits   CULTURE, URINE   CBC WITH AUTOMATED DIFF       Recent Results (from the past 12 hour(s))   CBC WITH AUTOMATED DIFF    Collection Time: 07/07/17  4:03 PM   Result Value Ref Range    WBC 6.2 4.6 - 13.2 K/uL    RBC 4.89 4.20 - 5.30 M/uL    HGB 14.6 12.0 - 16.0 g/dL    HCT 42.2 35.0 - 45.0 %    MCV 86.3 74.0 - 97.0 FL    MCH 29.9 24.0 - 34.0 PG    MCHC 34.6 31.0 - 37.0 g/dL    RDW 12.5 11.6 - 14.5 %    PLATELET 450 572 - 251 K/uL    MPV 9.3 9.2 - 11.8 FL    NEUTROPHILS 48 40 - 73 %    LYMPHOCYTES 43 21 - 52 %    MONOCYTES 5 3 - 10 %    EOSINOPHILS 4 0 - 5 %    BASOPHILS 0 0 - 2 %    ABS. NEUTROPHILS 2.9 1.8 - 8.0 K/UL    ABS. LYMPHOCYTES 2.7 0.9 - 3.6 K/UL    ABS. MONOCYTES 0.3 0.05 - 1.2 K/UL    ABS. EOSINOPHILS 0.2 0.0 - 0.4 K/UL    ABS. BASOPHILS 0.0 0.0 - 0.06 K/UL    DF AUTOMATED     METABOLIC PANEL, COMPREHENSIVE    Collection Time: 07/07/17  4:03 PM   Result Value Ref Range    Sodium 143 136 - 145 mmol/L    Potassium 3.8 3.5 - 5.5 mmol/L    Chloride 103 100 - 108 mmol/L    CO2 31 21 - 32 mmol/L    Anion gap 9 3.0 - 18 mmol/L    Glucose 104 (H) 74 - 99 mg/dL    BUN 25 (H) 7.0 - 18 MG/DL    Creatinine 1.22 0.6 - 1.3 MG/DL    BUN/Creatinine ratio 20 12 - 20      GFR est AA 51 (L) >60 ml/min/1.73m2    GFR est non-AA 42 (L) >60 ml/min/1.73m2    Calcium 9.4 8.5 - 10.1 MG/DL    Bilirubin, total 0.4 0.2 - 1.0 MG/DL    ALT (SGPT) 21 13 - 56 U/L    AST (SGOT) 18 15 - 37 U/L    Alk.  phosphatase 100 45 - 117 U/L    Protein, total 7.3 6.4 - 8.2 g/dL    Albumin 3.7 3.4 - 5.0 g/dL    Globulin 3.6 2.0 - 4.0 g/dL    A-G Ratio 1.0 0.8 - 1.7     URINALYSIS W/ RFLX MICROSCOPIC    Collection Time: 07/07/17  4:50 PM   Result Value Ref Range    Color YELLOW      Appearance CLEAR      Specific gravity 1.013 1.005 - 1.030      pH (UA) 6.5 5.0 - 8.0      Protein NEGATIVE  NEG mg/dL    Glucose NEGATIVE  NEG mg/dL    Ketone NEGATIVE  NEG mg/dL    Bilirubin NEGATIVE  NEG      Blood NEGATIVE  NEG      Urobilinogen 0.2 0.2 - 1.0 EU/dL    Nitrites NEGATIVE  NEG      Leukocyte Esterase MODERATE (A) NEG     URINE MICROSCOPIC ONLY    Collection Time: 07/07/17  4:50 PM   Result Value Ref Range    WBC 6 to 10 0 - 5 /hpf    RBC NEGATIVE  0 - 5 /hpf    Epithelial cells FEW 0 - 5 /lpf    Bacteria FEW (A) NEG /hpf          MDM  Number of Diagnoses or Management Options  Acute bilateral low back pain without sciatica:   UTI (urinary tract infection), uncomplicated:      Amount and/or Complexity of Data Reviewed  Clinical lab tests: ordered and reviewed  Tests in the radiology section of CPT®: ordered and reviewed (XR lumbar spine)  Review and summarize past medical records: yes  Independent visualization of images, tracings, or specimens: yes (XR lumbar spine)      ED Course     Medications   cephALEXin (KEFLEX) capsule 500 mg (not administered)        Procedures    PROGRESS NOTE:  4:08 PM  Initial assessment performed. Written by Vidhya Turner ED Scribe, as dictated by Jamal Chavez PA-C.    DISCHARGE NOTE:  7:31 PM  The patient is ready for discharge. The patient's signs, symptoms, diagnosis, and discharge instructions have been discussed and the patient and/or family has conveyed their understanding. The patient and/or family is to follow up as recommended or return to the ER should their symptoms worsen. Plan has been discussed and the patient and/or family is in agreement. Written by Codi Sharma ED Scribe, as dictated by Jamal Chavez PA-C.     CLINICAL IMPRESSION:  1. Acute bilateral low back pain without sciatica    2. UTI (urinary tract infection), uncomplicated        PLAN:  1. D/C Home    2. Current Discharge Medication List      START taking these medications    Details   cephALEXin (KEFLEX) 500 mg capsule Take 1 Cap by mouth two (2) times a day for 7 days.   Qty: 14 Cap, Refills: 0             3.   Follow-up Information     Follow up With Details Comments Akshat Simon MD Schedule an appointment as soon as possible for a visit for orthopaedic follow up [phone] [facility] MD Danii Schedule an appointment as soon as possible for a visit for PCP follow up, as needed Stephania Jane [address]  [phone] [facility] [address]      THE [facility] EMERGENCY DEPT  As needed, If symptoms worsen [address] [place] [address] [phone] ATTESTATION:  This note is prepared by Enedelia Hook, acting as Scribe for Iker Salinas PA-C.    PROVIDER ATTESTATION:  Iker Salinas PA-C: The scribe's documentation has been prepared under my direction and personally reviewed by me in its entirety. I confirm that the note above accurately reflects all work, treatment, procedures, and medical decision making performed by me.

## 2017-07-07 NOTE — ED NOTES
Explanation given to pt as to why she was given 2 capsules of keflex and pt states \"so you don't even have the right medicine here in this hospital\".

## 2017-07-09 LAB
BACTERIA SPEC CULT: NORMAL
SERVICE CMNT-IMP: NORMAL

## 2017-11-06 ENCOUNTER — HOSPITAL ENCOUNTER (EMERGENCY)
Age: 82
Discharge: HOME | End: 2017-11-06
Attending: EMERGENCY MEDICINE
Payer: MEDICARE

## 2017-11-06 VITALS
DIASTOLIC BLOOD PRESSURE: 110 MMHG | HEART RATE: 91 BPM | SYSTOLIC BLOOD PRESSURE: 185 MMHG | WEIGHT: 145 LBS | OXYGEN SATURATION: 100 % | RESPIRATION RATE: 16 BRPM | BODY MASS INDEX: 23.3 KG/M2 | TEMPERATURE: 99.4 F | HEIGHT: 66 IN

## 2017-11-06 DIAGNOSIS — D22.9 SEBACEOUS NEVUS: Primary | ICD-10-CM

## 2017-11-06 LAB
APPEARANCE UR: CLEAR
BACTERIA URNS QL MICRO: ABNORMAL /HPF
BILIRUB UR QL: NEGATIVE
COLOR UR: YELLOW
EPITH CASTS URNS QL MICRO: ABNORMAL /LPF (ref 0–5)
GLUCOSE UR STRIP.AUTO-MCNC: NEGATIVE MG/DL
HGB UR QL STRIP: NEGATIVE
KETONES UR QL STRIP.AUTO: NEGATIVE MG/DL
LEUKOCYTE ESTERASE UR QL STRIP.AUTO: ABNORMAL
NITRITE UR QL STRIP.AUTO: NEGATIVE
PH UR STRIP: 7 [PH] (ref 5–8)
PROT UR STRIP-MCNC: NEGATIVE MG/DL
RBC #/AREA URNS HPF: ABNORMAL /HPF (ref 0–5)
SP GR UR REFRACTOMETRY: 1.01 (ref 1–1.03)
UROBILINOGEN UR QL STRIP.AUTO: 0.2 EU/DL (ref 0.2–1)
WBC URNS QL MICRO: ABNORMAL /HPF (ref 0–5)

## 2017-11-06 PROCEDURE — 99283 EMERGENCY DEPT VISIT LOW MDM: CPT

## 2017-11-06 PROCEDURE — 81001 URINALYSIS AUTO W/SCOPE: CPT

## 2017-11-06 RX ORDER — HYDROCORTISONE 1 %
CREAM (GRAM) TOPICAL ONCE
Status: DISCONTINUED | OUTPATIENT
Start: 2017-11-06 | End: 2017-11-06 | Stop reason: HOSPADM

## 2017-11-06 RX ORDER — MAG HYDROX/ALUMINUM HYD/SIMETH 200-200-20
SUSPENSION, ORAL (FINAL DOSE FORM) ORAL 2 TIMES DAILY
Qty: 30 G | Refills: 0 | Status: SHIPPED | OUTPATIENT
Start: 2017-11-06 | End: 2017-11-16

## 2017-11-06 NOTE — DISCHARGE INSTRUCTIONS
Dermatitis: Care Instructions  Your Care Instructions  Dermatitis is the general name used for any rash or inflammation of the skin. Different kinds of dermatitis cause different kinds of rashes. Common causes of a rash include new medicines, plants (such as poison oak or poison ivy), heat, and stress. Certain illnesses can also cause a rash. An allergic reaction to something that touches your skin, such as latex, nickel, or poison ivy, is called contact dermatitis. Contact dermatitis may also be caused by something that irritates the skin, such as bleach, a chemical, or soap. These types of rashes cannot be spread from person to person. How long your rash will last depends on what caused it. Rashes may last a few days or months. Follow-up care is a key part of your treatment and safety. Be sure to make and go to all appointments, and call your doctor if you are having problems. It's also a good idea to know your test results and keep a list of the medicines you take. How can you care for yourself at home? · Do not scratch the rash. Cut your nails short, and file them smooth. Or wear gloves if this helps keep you from scratching. · Wash the area with water only. Pat dry. · Put cold, wet cloths on the rash to reduce itching. · Keep cool, and stay out of the sun. · Leave the rash open to the air as much as possible. · If the rash itches, use hydrocortisone cream. Follow the directions on the label. Calamine lotion may help for plant rashes. · Take an over-the-counter antihistamine, such as diphenhydramine (Benadryl) or loratadine (Claritin), to help calm the itching. Read and follow all instructions on the label. · If your doctor prescribed a cream, use it as directed. If your doctor prescribed medicine, take it exactly as directed. When should you call for help?   Call your doctor now or seek immediate medical care if:  ? · You have symptoms of infection, such as:  ¨ Increased pain, swelling, warmth, or redness. ¨ Red streaks leading from the area. ¨ Pus draining from the area. ¨ A fever. ? · You have joint pain along with the rash. ? Watch closely for changes in your health, and be sure to contact your doctor if:  ? · Your rash is changing or getting worse. ? · You are not getting better as expected. Where can you learn more? Go to http://korin-lauren.info/. Enter ([phone] in the search box to learn more about \"Dermatitis: Care Instructions. \"  Current as of: October 13, 2016  Content Version: 11.4  © 3535-9437 [employer]. Care instructions adapted under license by [employer] (which disclaims liability or warranty for this information). If you have questions about a medical condition or this instruction, always ask your healthcare professional. [address] any warranty or liability for your use of this information.

## 2017-11-06 NOTE — ED TRIAGE NOTES
Patient arrived to ER via EMS with a rash of unknown onset to back and chest, along with AMS. Patient reports that the rash does itch at times.  Orientation is x 2

## 2017-11-06 NOTE — ED PROVIDER NOTES
[address]  EMERGENCY DEPARTMENT HISTORY AND PHYSICAL EXAM       Date: 11/6/2017   Patient Name: Ric Felipe   YOB: 1935  Medical Record Number: [id number]    History of Presenting Illness     Chief Complaint   Patient presents with    Rash     altered mental status        History Provided By:  patient    Additional History: 12:46 PM  Ric Felipe is a 81 y.o. female who presents to the emergency department via EMS C/O pruritic rash (black spots) to back and chest onset 1 week ago. Associated sxs include gradually improving confusion and generalized abdominal pain. PCP is Dr. Sd Vaughn. Pt recently had one of her medications increased. Caregiver states pt took too much of her medication leading to an AMS, which Dr. Sd Vaughn stopped the medications and stated she would return to her baseline. PMHx includes diabetes, HTN, arthritis, high cholesterol, DVT, COPD and pneumonia. Pt denies fever, chills, and any other symptoms or complaints at this time. Primary Care Provider: Michelle Bowers MD   Specialist:    Past History     Past Medical History:   Past Medical History:   Diagnosis Date    Anxiety     Arthritis     DJD    Arthropathy     Asthma     C. difficile diarrhea     [facility]ch[facility] [address]     Depression     Depression     Diabetes ([facility] [address]     [facility] thrombosis[facility]     Gastrointestinal disorder     GERD (gastroesophageal reflux disease)     High cholesterol     Hypertension     Pain, chronic     Pneumonia     SOB (shortness of breath)         Past Surgical History:   Past Surgical History:   Procedure Laterality Date    HX CHOLECYSTECTOMY      HX CORONARY STENT PLACEMENT      HX GYN      c section    HX HYSTERECTOMY      HX ORTHOPAEDIC      back surgery    HX OTHER SURGICAL      laminectomy    NEUROLOGICAL PROCEDURE UNLISTED      back surgery        Family History:   No family history on file.      Social History: Social History   Substance Use Topics    Smoking status: Current Every Day Smoker     Packs/day: 0.50    Smokeless tobacco: Never Used    Alcohol use Yes      Comment: social        Allergies: Allergies   Allergen Reactions    Codeine Itching    Dilaudid [Hydromorphone (Bulk)] Other (comments)     Feel like i am in another world.  Percocet [Oxycodone-Acetaminophen] Itching    Sulfa (Sulfonamide Antibiotics) Itching        Review of Systems   Review of Systems   Constitutional: Negative for chills and fever. Gastrointestinal: Positive for abdominal pain (generalized). Skin: Positive for rash (pruritic). Negative for wound. Psychiatric/Behavioral: Positive for confusion. All other systems reviewed and are negative. Physical Exam  Vitals:    11/06/17 1204   BP: (!) 185/110   Pulse: 91   Resp: 16   Temp: 99.4 °F (37.4 °C)   SpO2: 100%   Weight: 65.8 kg (145 lb)   Height: 5' 6\" (1.676 m)       Physical Exam   Nursing note and vitals reviewed. Constitutional: Alert. Well appearing, no acute distress  Head: Normocephalic, Atraumatic  Eyes: Pupils are equal, round, and reactive to light, EOMI  ENT: Moist mucous membranes, oropharynx clear. Neck: Supple, non-tender  Cardiovascular: Regular rate and rhythm, no murmurs, rubs, or gallops  Chest: Normal work of breathing and chest excursion bilaterally  Lungs: Clear to ausculation bilaterally  Abdomen: Soft, non tender, non distended, normoactive bowel sounds  Back: No evidence of trauma or deformity. No CVA Tenderness. Extremities: No evidence of trauma or deformity, no LE edema  Skin: Warm and dry. Multiple areas of black plaque like skin lesions raised over the entire mid to lower back with mild surrounding erythema. Same lesions scattered over the lower abdomin with no associated erythema.    Neuro: Alert and appropriate, facial movement symmetric, normal speech, strength and sensation full and symmetric bilaterally, normal gait, normal coordination  Psychiatric: Normal mood and affect      Diagnostic Study Results     Labs -      Recent Results (from the past 12 hour(s))   URINALYSIS W/ RFLX MICROSCOPIC    Collection Time: 11/06/17 12:11 PM   Result Value Ref Range    Color YELLOW      Appearance CLEAR      Specific gravity 1.013 1.005 - 1.030      pH (UA) 7.0 5.0 - 8.0      Protein NEGATIVE  NEG mg/dL    Glucose NEGATIVE  NEG mg/dL    Ketone NEGATIVE  NEG mg/dL    Bilirubin NEGATIVE  NEG      Blood NEGATIVE  NEG      Urobilinogen 0.2 0.2 - 1.0 EU/dL    Nitrites NEGATIVE  NEG      Leukocyte Esterase SMALL (A) NEG     URINE MICROSCOPIC ONLY    Collection Time: 11/06/17 12:11 PM   Result Value Ref Range    WBC 4 to 10 0 - 5 /hpf    RBC NONE 0 - 5 /hpf    Epithelial cells 1+ 0 - 5 /lpf    Bacteria FEW (A) NEG /hpf       Radiologic Studies -  The following have been ordered and reviewed:  No orders to display           Medical Decision Making   I am the first provider for this patient. I reviewed the vital signs, available nursing notes, past medical history, past surgical history, family history and social history. Vital Signs-Reviewed the patient's vital signs. Patient Vitals for the past 12 hrs:   Temp Pulse Resp BP SpO2   11/06/17 1204 99.4 °F (37.4 °C) 91 16 (!) 185/110 100 %       Pulse Oximetry Analysis - Normal 100% on RA. Old Medical Records: Nursing notes. Procedures:   Procedures    ED Course:  12:46 PM  Initial assessment performed. The patients presenting problems have been discussed, and they are in agreement with the care plan formulated and outlined with them. I have encouraged them to ask questions as they arise throughout their visit. Medications Given in the ED:  Medications   hydrocortisone (CORTAID) 1 % cream (not administered)       Discharge Note:  2:32 PM  Pt has been reexamined. Patient has no new complaints, changes, or physical findings. Care plan outlined and precautions discussed.   Results were reviewed with the patient. All medications were reviewed with the patient; will d/c home with Hycort. All of pt's questions and concerns were addressed. Patient was instructed and agrees to follow up with PCP, as well as to return to the ED upon further deterioration. Patient is ready to go home. Diagnosis   Clinical Impression:   1. Sebaceous nevus         Discussion:  81 y.o female presents for evaluation if itchy low back rash consistent with sebaceous nevi that appear to be acutely irritated with mild surrounding erythema. No evidence of cellulitis, abscess, shingles, allergic reaction or other concerning skin findings. Pt has been having AMS however this was secondary to overtaking her regular medications and have been improving. UA negative for UTI. Will discharge with hydrocortisone cream and PCP follow up. Follow-up Information     Follow up With Details Comments Contact Info    Gris Quinn MD Schedule an appointment as soon as possible for a visit in 2 days For primary care follow up. [address]  [phone] [facility]      [facility] EMERGENCY DEPT Go to As needed, If symptoms worsen [address]  [phone]          Current Discharge Medication List      START taking these medications    Details   hydrocortisone (HYCORT) 1 % ointment Apply  to affected area two (2) times a day for 10 days. use thin layer  Qty: 30 g, Refills: 0             _______________________________   Attestations: This note is prepared by Harley Saunders, acting as a Scribe for Cecille Parker MD on 12:05 PM on 11/6/2017. Cecille Parker MD: The scribe's documentation has been prepared under my direction and personally reviewed by me in its entirety.   _______________________________

## 2018-04-15 ENCOUNTER — HOSPITAL ENCOUNTER (EMERGENCY)
Age: 83
Discharge: HOME | End: 2018-04-16
Attending: EMERGENCY MEDICINE
Payer: MEDICARE

## 2018-04-15 ENCOUNTER — APPOINTMENT (OUTPATIENT)
Dept: CT IMAGING | Age: 83
End: 2018-04-15
Attending: EMERGENCY MEDICINE
Payer: MEDICARE

## 2018-04-15 DIAGNOSIS — I10 ESSENTIAL HYPERTENSION: ICD-10-CM

## 2018-04-15 DIAGNOSIS — R19.7 DIARRHEA, UNSPECIFIED TYPE: ICD-10-CM

## 2018-04-15 DIAGNOSIS — R11.2 NON-INTRACTABLE VOMITING WITH NAUSEA, UNSPECIFIED VOMITING TYPE: ICD-10-CM

## 2018-04-15 DIAGNOSIS — J42 CHRONIC BRONCHITIS, UNSPECIFIED CHRONIC BRONCHITIS TYPE (HCC): ICD-10-CM

## 2018-04-15 DIAGNOSIS — R10.13 ABDOMINAL PAIN, EPIGASTRIC: Primary | ICD-10-CM

## 2018-04-15 DIAGNOSIS — B34.9 VIRAL SYNDROME: ICD-10-CM

## 2018-04-15 LAB
ALBUMIN SERPL-MCNC: 3.6 G/DL (ref 3.4–5)
ALBUMIN/GLOB SERPL: 1 {RATIO} (ref 0.8–1.7)
ALP SERPL-CCNC: 105 U/L (ref 45–117)
ALT SERPL-CCNC: 22 U/L (ref 13–56)
ANION GAP SERPL CALC-SCNC: 7 MMOL/L (ref 3–18)
APPEARANCE UR: CLEAR
AST SERPL-CCNC: 13 U/L (ref 15–37)
BASOPHILS # BLD: 0 K/UL (ref 0–0.06)
BASOPHILS NFR BLD: 0 % (ref 0–2)
BILIRUB SERPL-MCNC: 0.2 MG/DL (ref 0.2–1)
BILIRUB UR QL: NEGATIVE
BNP SERPL-MCNC: 322 PG/ML (ref 0–1800)
BUN SERPL-MCNC: 26 MG/DL (ref 7–18)
BUN/CREAT SERPL: 23 (ref 12–20)
CALCIUM SERPL-MCNC: 9.4 MG/DL (ref 8.5–10.1)
CHLORIDE SERPL-SCNC: 103 MMOL/L (ref 100–108)
CK MB CFR SERPL CALC: 2.3 % (ref 0–4)
CK MB SERPL-MCNC: 1.4 NG/ML (ref 5–25)
CK SERPL-CCNC: 62 U/L (ref 26–192)
CO2 SERPL-SCNC: 33 MMOL/L (ref 21–32)
COLOR UR: NORMAL
CREAT SERPL-MCNC: 1.12 MG/DL (ref 0.6–1.3)
DIFFERENTIAL METHOD BLD: ABNORMAL
EOSINOPHIL # BLD: 0.2 K/UL (ref 0–0.4)
EOSINOPHIL NFR BLD: 2 % (ref 0–5)
ERYTHROCYTE [DISTWIDTH] IN BLOOD BY AUTOMATED COUNT: 12.7 % (ref 11.6–14.5)
GLOBULIN SER CALC-MCNC: 3.7 G/DL (ref 2–4)
GLUCOSE SERPL-MCNC: 135 MG/DL (ref 74–99)
GLUCOSE UR STRIP.AUTO-MCNC: NEGATIVE MG/DL
HCT VFR BLD AUTO: 43.1 % (ref 35–45)
HGB BLD-MCNC: 14.4 G/DL (ref 12–16)
HGB UR QL STRIP: NEGATIVE
KETONES UR QL STRIP.AUTO: NEGATIVE MG/DL
LEUKOCYTE ESTERASE UR QL STRIP.AUTO: NEGATIVE
LIPASE SERPL-CCNC: 220 U/L (ref 73–393)
LYMPHOCYTES # BLD: 2.7 K/UL (ref 0.9–3.6)
LYMPHOCYTES NFR BLD: 33 % (ref 21–52)
MAGNESIUM SERPL-MCNC: 2.2 MG/DL (ref 1.6–2.6)
MCH RBC QN AUTO: 29.2 PG (ref 24–34)
MCHC RBC AUTO-ENTMCNC: 33.4 G/DL (ref 31–37)
MCV RBC AUTO: 87.4 FL (ref 74–97)
MONOCYTES # BLD: 0.5 K/UL (ref 0.05–1.2)
MONOCYTES NFR BLD: 6 % (ref 3–10)
NEUTS SEG # BLD: 4.8 K/UL (ref 1.8–8)
NEUTS SEG NFR BLD: 59 % (ref 40–73)
NITRITE UR QL STRIP.AUTO: NEGATIVE
PH UR STRIP: 7.5 [PH] (ref 5–8)
PLATELET # BLD AUTO: 338 K/UL (ref 135–420)
PMV BLD AUTO: 8.8 FL (ref 9.2–11.8)
POTASSIUM SERPL-SCNC: 3.3 MMOL/L (ref 3.5–5.5)
PROT SERPL-MCNC: 7.3 G/DL (ref 6.4–8.2)
PROT UR STRIP-MCNC: NEGATIVE MG/DL
RBC # BLD AUTO: 4.93 M/UL (ref 4.2–5.3)
SODIUM SERPL-SCNC: 143 MMOL/L (ref 136–145)
SP GR UR REFRACTOMETRY: 1.01 (ref 1–1.03)
TROPONIN I SERPL-MCNC: <0.02 NG/ML (ref 0–0.06)
UROBILINOGEN UR QL STRIP.AUTO: 0.2 EU/DL (ref 0.2–1)
WBC # BLD AUTO: 8.2 K/UL (ref 4.6–13.2)

## 2018-04-15 PROCEDURE — 83735 ASSAY OF MAGNESIUM: CPT | Performed by: EMERGENCY MEDICINE

## 2018-04-15 PROCEDURE — 74011250637 HC RX REV CODE- 250/637: Performed by: EMERGENCY MEDICINE

## 2018-04-15 PROCEDURE — 94640 AIRWAY INHALATION TREATMENT: CPT

## 2018-04-15 PROCEDURE — 77030013140 HC MSK NEB VYRM -A

## 2018-04-15 PROCEDURE — 74011636320 HC RX REV CODE- 636/320: Performed by: EMERGENCY MEDICINE

## 2018-04-15 PROCEDURE — 82550 ASSAY OF CK (CPK): CPT | Performed by: EMERGENCY MEDICINE

## 2018-04-15 PROCEDURE — 80053 COMPREHEN METABOLIC PANEL: CPT | Performed by: EMERGENCY MEDICINE

## 2018-04-15 PROCEDURE — 74011250636 HC RX REV CODE- 250/636: Performed by: EMERGENCY MEDICINE

## 2018-04-15 PROCEDURE — 96374 THER/PROPH/DIAG INJ IV PUSH: CPT

## 2018-04-15 PROCEDURE — 83690 ASSAY OF LIPASE: CPT | Performed by: EMERGENCY MEDICINE

## 2018-04-15 PROCEDURE — 83880 ASSAY OF NATRIURETIC PEPTIDE: CPT | Performed by: EMERGENCY MEDICINE

## 2018-04-15 PROCEDURE — 99285 EMERGENCY DEPT VISIT HI MDM: CPT

## 2018-04-15 PROCEDURE — 81003 URINALYSIS AUTO W/O SCOPE: CPT | Performed by: EMERGENCY MEDICINE

## 2018-04-15 PROCEDURE — 85025 COMPLETE CBC W/AUTO DIFF WBC: CPT | Performed by: EMERGENCY MEDICINE

## 2018-04-15 PROCEDURE — 96361 HYDRATE IV INFUSION ADD-ON: CPT

## 2018-04-15 PROCEDURE — 74011000250 HC RX REV CODE- 250: Performed by: EMERGENCY MEDICINE

## 2018-04-15 PROCEDURE — 96375 TX/PRO/DX INJ NEW DRUG ADDON: CPT

## 2018-04-15 PROCEDURE — 74177 CT ABD & PELVIS W/CONTRAST: CPT

## 2018-04-15 RX ORDER — ONDANSETRON 2 MG/ML
4 INJECTION INTRAMUSCULAR; INTRAVENOUS
Status: COMPLETED | OUTPATIENT
Start: 2018-04-15 | End: 2018-04-15

## 2018-04-15 RX ORDER — ONDANSETRON 4 MG/1
4 TABLET, ORALLY DISINTEGRATING ORAL
Qty: 20 TAB | Refills: 0 | Status: SHIPPED | OUTPATIENT
Start: 2018-04-15

## 2018-04-15 RX ORDER — RANITIDINE 150 MG/1
150 CAPSULE ORAL 2 TIMES DAILY
COMMUNITY

## 2018-04-15 RX ORDER — LOSARTAN POTASSIUM 50 MG/1
25 TABLET ORAL ONCE
Status: COMPLETED | OUTPATIENT
Start: 2018-04-15 | End: 2018-04-15

## 2018-04-15 RX ORDER — OMEPRAZOLE 20 MG/1
20 CAPSULE, DELAYED RELEASE ORAL DAILY
COMMUNITY

## 2018-04-15 RX ORDER — POTASSIUM CHLORIDE 20 MEQ/1
20 TABLET, EXTENDED RELEASE ORAL DAILY
COMMUNITY

## 2018-04-15 RX ORDER — DICYCLOMINE HYDROCHLORIDE 10 MG/1
10 CAPSULE ORAL 4 TIMES DAILY
Qty: 20 CAP | Refills: 0 | Status: SHIPPED | OUTPATIENT
Start: 2018-04-15 | End: 2018-04-20

## 2018-04-15 RX ORDER — LORAZEPAM 2 MG/ML
0.5 INJECTION INTRAMUSCULAR ONCE
Status: COMPLETED | OUTPATIENT
Start: 2018-04-15 | End: 2018-04-15

## 2018-04-15 RX ORDER — IPRATROPIUM BROMIDE AND ALBUTEROL SULFATE 2.5; .5 MG/3ML; MG/3ML
3 SOLUTION RESPIRATORY (INHALATION)
Status: COMPLETED | OUTPATIENT
Start: 2018-04-15 | End: 2018-04-15

## 2018-04-15 RX ORDER — LISINOPRIL 5 MG/1
20 TABLET ORAL DAILY
Status: DISCONTINUED | OUTPATIENT
Start: 2018-04-16 | End: 2018-04-16

## 2018-04-15 RX ORDER — LORAZEPAM 2 MG/ML
INJECTION INTRAMUSCULAR
Status: DISCONTINUED
Start: 2018-04-15 | End: 2018-04-16 | Stop reason: HOSPADM

## 2018-04-15 RX ADMIN — NIFEDIPINE 90 MG: 60 TABLET, FILM COATED, EXTENDED RELEASE ORAL at 23:53

## 2018-04-15 RX ADMIN — IOPAMIDOL 100 ML: 612 INJECTION, SOLUTION INTRAVENOUS at 22:31

## 2018-04-15 RX ADMIN — LORAZEPAM 0.5 MG: 2 INJECTION INTRAMUSCULAR; INTRAVENOUS at 22:40

## 2018-04-15 RX ADMIN — SODIUM CHLORIDE 500 ML: 900 INJECTION, SOLUTION INTRAVENOUS at 22:01

## 2018-04-15 RX ADMIN — ONDANSETRON 4 MG: 2 INJECTION INTRAMUSCULAR; INTRAVENOUS at 22:01

## 2018-04-15 RX ADMIN — IPRATROPIUM BROMIDE AND ALBUTEROL SULFATE 3 ML: .5; 3 SOLUTION RESPIRATORY (INHALATION) at 23:14

## 2018-04-15 RX ADMIN — LOSARTAN POTASSIUM 25 MG: 50 TABLET ORAL at 23:52

## 2018-04-16 VITALS
DIASTOLIC BLOOD PRESSURE: 79 MMHG | WEIGHT: 154 LBS | OXYGEN SATURATION: 94 % | HEART RATE: 102 BPM | RESPIRATION RATE: 18 BRPM | TEMPERATURE: 98.5 F | BODY MASS INDEX: 22.05 KG/M2 | SYSTOLIC BLOOD PRESSURE: 182 MMHG | HEIGHT: 70 IN

## 2018-04-16 PROCEDURE — 74011250637 HC RX REV CODE- 250/637

## 2018-04-16 RX ORDER — LISINOPRIL 5 MG/1
20 TABLET ORAL
Status: COMPLETED | OUTPATIENT
Start: 2018-04-16 | End: 2018-04-16

## 2018-04-16 RX ADMIN — LISINOPRIL 20 MG: 5 TABLET ORAL at 00:08

## 2018-04-16 NOTE — DISCHARGE INSTRUCTIONS
Bronchitis: Care Instructions  Your Care Instructions    Bronchitis is inflammation of the bronchial tubes, which carry air to the lungs. The tubes swell and produce mucus, or phlegm. The mucus and inflamed bronchial tubes make you cough. You may have trouble breathing. Most cases of bronchitis are caused by viruses like those that cause colds. Antibiotics usually do not help and they may be harmful. Bronchitis usually develops rapidly and lasts about 2 to 3 weeks in otherwise healthy people. Follow-up care is a key part of your treatment and safety. Be sure to make and go to all appointments, and call your doctor if you are having problems. It's also a good idea to know your test results and keep a list of the medicines you take. How can you care for yourself at home? · Take all medicines exactly as prescribed. Call your doctor if you think you are having a problem with your medicine. · Get some extra rest.  · Take an over-the-counter pain medicine, such as acetaminophen (Tylenol), ibuprofen (Advil, Motrin), or naproxen (Aleve) to reduce fever and relieve body aches. Read and follow all instructions on the label. · Do not take two or more pain medicines at the same time unless the doctor told you to. Many pain medicines have acetaminophen, which is Tylenol. Too much acetaminophen (Tylenol) can be harmful. · Take an over-the-counter cough medicine that contains dextromethorphan to help quiet a dry, hacking cough so that you can sleep. Avoid cough medicines that have more than one active ingredient. Read and follow all instructions on the label. · Breathe moist air from a humidifier, hot shower, or sink filled with hot water. The heat and moisture will thin mucus so you can cough it out. · Do not smoke. Smoking can make bronchitis worse. If you need help quitting, talk to your doctor about stop-smoking programs and medicines. These can increase your chances of quitting for good.   When should you call for help? Call 911 anytime you think you may need emergency care. For example, call if:  ? · You have severe trouble breathing. ?Call your doctor now or seek immediate medical care if:  ? · You have new or worse trouble breathing. ? · You cough up dark brown or bloody mucus (sputum). ? · You have a new or higher fever. ? · You have a new rash. ? Watch closely for changes in your health, and be sure to contact your doctor if:  ? · You cough more deeply or more often, especially if you notice more mucus or a change in the color of your mucus. ? · You are not getting better as expected. Where can you learn more? Go to http://korin-lauren.info/. Enter H333 in the search box to learn more about \"Bronchitis: Care Instructions. \"  Current as of: May 12, 2017  Content Version: 11.4  © 3406-9712 [employer]. Care instructions adapted under license by [employer] (which disclaims liability or warranty for this information). If you have questions about a medical condition or this instruction, always ask your healthcare professional. [place] [address] any warranty or liability for your use of this information. Abdominal Pain: Care Instructions  Your Care Instructions    Abdominal pain has many possible causes. Some aren't serious and get better on their own in a few days. Others need more testing and treatment. If your pain continues or gets worse, you need to be rechecked and may need more tests to find out what is wrong. You may need surgery to correct the problem. Don't ignore new symptoms, such as fever, nausea and vomiting, urination problems, pain that gets worse, and dizziness. These may be signs of a more serious problem. Your doctor may have recommended a follow-up visit in the next 8 to 12 hours. If you are not getting better, you may need more tests or treatment. The doctor has checked you carefully, but problems can develop later.  If you notice any problems or new symptoms, get medical treatment right away. Follow-up care is a key part of your treatment and safety. Be sure to make and go to all appointments, and call your doctor if you are having problems. It's also a good idea to know your test results and keep a list of the medicines you take. How can you care for yourself at home? · Rest until you feel better. · To prevent dehydration, drink plenty of fluids, enough so that your urine is light yellow or clear like water. Choose water and other caffeine-free clear liquids until you feel better. If you have kidney, heart, or liver disease and have to limit fluids, talk with your doctor before you increase the amount of fluids you drink. · If your stomach is upset, eat mild foods, such as rice, dry toast or crackers, bananas, and applesauce. Try eating several small meals instead of two or three large ones. · Wait until 48 hours after all symptoms have gone away before you have spicy foods, alcohol, and drinks that contain caffeine. · Do not eat foods that are high in fat. · Avoid anti-inflammatory medicines such as aspirin, ibuprofen (Advil, Motrin), and naproxen (Aleve). These can cause stomach upset. Talk to your doctor if you take daily aspirin for another health problem. When should you call for help? Call 911 anytime you think you may need emergency care. For example, call if:  ? · You passed out (lost consciousness). ? · You pass maroon or very bloody stools. ? · You vomit blood or what looks like coffee grounds. ? · You have new, severe belly pain. ?Call your doctor now or seek immediate medical care if:  ? · Your pain gets worse, especially if it becomes focused in one area of your belly. ? · You have a new or higher fever. ? · Your stools are black and look like tar, or they have streaks of blood. ? · You have unexpected vaginal bleeding. ? · You have symptoms of a urinary tract infection.  These may include:  ¨ Pain when you urinate. ¨ Urinating more often than usual.  ¨ Blood in your urine. ? · You are dizzy or lightheaded, or you feel like you may faint. ? Watch closely for changes in your health, and be sure to contact your doctor if:  ? · You are not getting better after 1 day (24 hours). Where can you learn more? Go to http://korin-lauren.info/. Enter G805 in the search box to learn more about \"Abdominal Pain: Care Instructions. \"  Current as of: March 20, 2017  Content Version: 11.4  © 5289-5020 [employer]. Care instructions adapted under license by [employer] (which disclaims liability or warranty for this information). If you have questions about a medical condition or this instruction, always ask your healthcare professional. [address] any warranty or liability for your use of this information. Diarrhea: Care Instructions  Your Care Instructions    Diarrhea is loose, watery stools (bowel movements). The exact cause is often hard to find. Sometimes diarrhea is your body's way of getting rid of what caused an upset stomach. Viruses, food poisoning, and many medicines can cause diarrhea. Some people get diarrhea in response to emotional stress, anxiety, or certain foods. Almost everyone has diarrhea now and then. It usually isn't serious, and your stools will return to normal soon. The important thing to do is replace the fluids you have lost, so you can prevent dehydration. The doctor has checked you carefully, but problems can develop later. If you notice any problems or new symptoms, get medical treatment right away. Follow-up care is a key part of your treatment and safety. Be sure to make and go to all appointments, and call your doctor if you are having problems. It's also a good idea to know your test results and keep a list of the medicines you take. How can you care for yourself at home?   · Watch for signs of dehydration, which means your body has lost too much water. Dehydration is a serious condition and should be treated right away. Signs of dehydration are:  ¨ Increasing thirst and dry eyes and mouth. ¨ Feeling faint or lightheaded. ¨ Darker urine, and a smaller amount of urine than normal.  · To prevent dehydration, drink plenty of fluids, enough so that your urine is light yellow or clear like water. Choose water and other caffeine-free clear liquids until you feel better. If you have kidney, heart, or liver disease and have to limit fluids, talk with your doctor before you increase the amount of fluids you drink. · Begin eating small amounts of mild foods the next day, if you feel like it. ¨ Try yogurt that has live cultures of Lactobacillus. (Check the label.)  ¨ Avoid spicy foods, fruits, alcohol, and caffeine until 48 hours after all symptoms are gone. ¨ Avoid chewing gum that contains sorbitol. ¨ Avoid dairy products (except for yogurt with Lactobacillus) while you have diarrhea and for 3 days after symptoms are gone. · The doctor may recommend that you take over-the-counter medicine, such as loperamide (Imodium), if you still have diarrhea after 6 hours. Read and follow all instructions on the label. Do not use this medicine if you have bloody diarrhea, a high fever, or other signs of serious illness. Call your doctor if you think you are having a problem with your medicine. When should you call for help? Call 911 anytime you think you may need emergency care. For example, call if:  ? · You passed out (lost consciousness). ? · Your stools are maroon or very bloody. ?Call your doctor now or seek immediate medical care if:  ? · You are dizzy or lightheaded, or you feel like you may faint. ? · Your stools are black and look like tar, or they have streaks of blood. ? · You have new or worse belly pain. ? · You have symptoms of dehydration, such as:  ¨ Dry eyes and a dry mouth.   ¨ Passing only a little dark urine. ¨ Feeling thirstier than usual.   ? · You have a new or higher fever. ? Watch closely for changes in your health, and be sure to contact your doctor if:  ? · Your diarrhea is getting worse. ? · You see pus in the diarrhea. ? · You are not getting better after 2 days (48 hours). Where can you learn more? Go to http://korin-lauren.info/. Enter Z339 in the search box to learn more about \"Diarrhea: Care Instructions. \"  Current as of: March 20, 2017  Content Version: 11.4  © 2413-7381 [employer]. Care instructions adapted under license by [employer] (which disclaims liability or warranty for this information). If you have questions about a medical condition or this instruction, always ask your healthcare professional. [address] any warranty or liability for your use of this information. Nausea and Vomiting: Care Instructions  Your Care Instructions    When you are nauseated, you may feel weak and sweaty and notice a lot of saliva in your mouth. Nausea often leads to vomiting. Most of the time you do not need to worry about nausea and vomiting, but they can be signs of other illnesses. Two common causes of nausea and vomiting are stomach flu and food poisoning. Nausea and vomiting from viral stomach flu will usually start to improve within 24 hours. Nausea and vomiting from food poisoning may last from 12 to 48 hours. The doctor has checked you carefully, but problems can develop later. If you notice any problems or new symptoms, get medical treatment right away. Follow-up care is a key part of your treatment and safety. Be sure to make and go to all appointments, and call your doctor if you are having problems. It's also a good idea to know your test results and keep a list of the medicines you take. How can you care for yourself at home?   · To prevent dehydration, drink plenty of fluids, enough so that your urine is light yellow or clear like water. Choose water and other caffeine-free clear liquids until you feel better. If you have kidney, heart, or liver disease and have to limit fluids, talk with your doctor before you increase the amount of fluids you drink. · Rest in bed until you feel better. · When you are able to eat, try clear soups, mild foods, and liquids until all symptoms are gone for 12 to 48 hours. Other good choices include dry toast, crackers, cooked cereal, and gelatin dessert, such as Jell-O. When should you call for help? Call 911 anytime you think you may need emergency care. For example, call if:  ? · You passed out (lost consciousness). ?Call your doctor now or seek immediate medical care if:  ? · You have symptoms of dehydration, such as:  ¨ Dry eyes and a dry mouth. ¨ Passing only a little dark urine. ¨ Feeling thirstier than usual.   ? · You have new or worsening belly pain. ? · You have a new or higher fever. ? · You vomit blood or what looks like coffee grounds. ? Watch closely for changes in your health, and be sure to contact your doctor if:  ? · You have ongoing nausea and vomiting. ? · Your vomiting is getting worse. ? · Your vomiting lasts longer than 2 days. ? · You are not getting better as expected. Where can you learn more? Go to http://korin-lauren.info/. Enter 25 [id number] in the search box to learn more about \"Nausea and Vomiting: Care Instructions. \"  Current as of: March 20, 2017  Content Version: 11.4  © 0248-0076 [employer]. Care instructions adapted under license by [employer] (which disclaims liability or warranty for this information). If you have questions about a medical condition or this instruction, always ask your healthcare professional. [place] [address] any warranty or liability for your use of this information.          Viral Infections: Care Instructions  Your Care Instructions    You don't feel well, but it's not clear what's causing it. You may have a viral infection. Viruses cause many illnesses, such as the common cold, influenza, fever, rashes, and the diarrhea, nausea, and vomiting that are often called \"stomach flu. \" You may wonder if antibiotic medicines could make you feel better. But antibiotics only treat infections caused by bacteria. They don't work on viruses. The good news is that viral infections usually aren't serious. Most will go away in a few days without medical treatment. In the meantime, there are a few things you can do to make yourself more comfortable. Follow-up care is a key part of your treatment and safety. Be sure to make and go to all appointments, and call your doctor if you are having problems. It's also a good idea to know your test results and keep a list of the medicines you take. How can you care for yourself at home? · Get plenty of rest if you feel tired. · Take an over-the-counter pain medicine if needed, such as acetaminophen (Tylenol), ibuprofen (Advil, Motrin), or naproxen (Aleve). Read and follow all instructions on the label. · Be careful when taking over-the-counter cold or flu medicines and Tylenol at the same time. Many of these medicines have acetaminophen, which is Tylenol. Read the labels to make sure that you are not taking more than the recommended dose. Too much acetaminophen (Tylenol) can be harmful. · Drink plenty of fluids, enough so that your urine is light yellow or clear like water. If you have kidney, heart, or liver disease and have to limit fluids, talk with your doctor before you increase the amount of fluids you drink. · Stay home from work, school, and other public places while you have a fever. When should you call for help? Call 911 anytime you think you may need emergency care. For example, call if:  ? · You have severe trouble breathing. ? · You passed out (lost consciousness).    ?Call your doctor now or seek immediate medical care if:  ? · You seem to be getting much sicker. ? · You have a new or higher fever. ? · You have blood in your stools. ? · You have new belly pain, or your pain gets worse. ? · You have a new rash. ? Watch closely for changes in your health, and be sure to contact your doctor if:  ? · You start to get better and then get worse. ? · You do not get better as expected. Where can you learn more? Go to http://korin-lauren.info/. Enter F790 in the search box to learn more about \"Viral Infections: Care Instructions. \"  Current as of: March 3, 2017  Content Version: 11.4  © 2929-0604 [employer]. Care instructions adapted under license by [employer] (which disclaims liability or warranty for this information). If you have questions about a medical condition or this instruction, always ask your healthcare professional. [place] [address] any warranty or liability for your use of this information. Acute High Blood Pressure: Care Instructions  Your Care Instructions    Acute high blood pressure is very high blood pressure. It's a serious problem. Very high blood pressure can damage your brain, heart, eyes, and kidneys. You may have been given medicines to lower your blood pressure. You may have gotten them as pills or through a needle in one of your veins. This is called an IV. And maybe you were given other medicines too. These can be needed when high blood pressure causes other problems. To keep your blood pressure at a lower level, you may need to make healthy lifestyle changes. And you will probably need to take medicines. Be sure to follow up with your doctor about your blood pressure and what you can do about it. Follow-up care is a key part of your treatment and safety. Be sure to make and go to all appointments, and call your doctor if you are having problems.  It's also a good idea to know your test results and keep a list of the medicines you take. How can you care for yourself at home? · See your doctor as often as he or she recommends. This is to make sure your blood pressure is under control. You will probably go at least 2 times a year. But it may be more often at first.  · Take your blood pressure medicine exactly as prescribed. You may take one or more types. They include diuretics, beta-blockers, ACE inhibitors, calcium channel blockers, and angiotensin II receptor blockers. Call your doctor if you think you are having a problem with your medicine. · If you take blood pressure medicine, talk to your doctor before you take decongestants or anti-inflammatory medicine, such as ibuprofen. These can raise blood pressure. · Learn how to check your blood pressure at home. Check it often. · Ask your doctor if it's okay to drink alcohol. · Talk to your doctor about lifestyle changes that can help blood pressure. These include being active and not smoking. When should you call for help? Call 911 anytime you think you may need emergency care. This may mean having symptoms that suggest that your blood pressure is causing a serious heart or blood vessel problem. Your blood pressure may be over 180/110. ? For example, call 911 if:  ? · You have symptoms of a heart attack. These may include:  ¨ Chest pain or pressure, or a strange feeling in the chest.  ¨ Sweating. ¨ Shortness of breath. ¨ Nausea or vomiting. ¨ Pain, pressure, or a strange feeling in the back, neck, jaw, or upper belly or in one or both shoulders or arms. ¨ Lightheadedness or sudden weakness. ¨ A fast or irregular heartbeat. ? · You have symptoms of a stroke. These may include:  ¨ Sudden numbness, tingling, weakness, or loss of movement in your face, arm, or leg, especially on only one side of your body. ¨ Sudden vision changes. ¨ Sudden trouble speaking. ¨ Sudden confusion or trouble understanding simple statements.   ¨ Sudden problems with walking or balance. ¨ A sudden, severe headache that is different from past headaches. ? · You have severe back or belly pain. ?Do not wait until your blood pressure comes down on its own. Get help right away. ?Call your doctor now or seek immediate care if:  ? · Your blood pressure is much higher than normal (such as 180/110 or higher), but you don't have symptoms. ? · You think high blood pressure is causing symptoms, such as:  ¨ Severe headache. ¨ Blurry vision. ? Watch closely for changes in your health, and be sure to contact your doctor if:  ? · Your blood pressure measures 140/90 or higher at least 2 times. That means the top number is 140 or higher or the bottom number is 90 or higher, or both. ? · You think you may be having side effects from your blood pressure medicine. ? · Your blood pressure is usually normal, but it goes above normal at least 2 times. Where can you learn more? Go to http://korin-lauren.info/. Enter C619 in the search box to learn more about \"Acute High Blood Pressure: Care Instructions. \"  Current as of: September 21, 2016  Content Version: 11.4  © 4379-4219 [employer]. Care instructions adapted under license by [employer] (which disclaims liability or warranty for this information). If you have questions about a medical condition or this instruction, always ask your healthcare professional. [place] [address] any warranty or liability for your use of this information.

## 2018-04-16 NOTE — ED NOTES
Updated Dr. Sreedhar Fraire, current patient status and hemodynamics BP consistent [id number] systolic. Patient pending CT Abd.   Plan: pending Physical Exam.

## 2018-04-16 NOTE — ED PROVIDER NOTES
EMERGENCY DEPARTMENT HISTORY AND PHYSICAL EXAM    Date: 4/15/2018  Patient Name: Nelda Gudino    History of Presenting Illness     Chief Complaint   Patient presents with    Abdominal Pain    Nausea         History Provided By: Patient    Chief Complaint: Vomiting  Duration: 3.5 hours   Timing:  Intermittent  Location: Abdomen  Associated Symptoms: nausea, abdominal pain, decreased appetite, and increased BM frequency    Additional History (Context):   9:27 PM  Nelda Gudino is a 81 y.o. female with PMHx of DM, HTN, GERD, asthma, COPD who presents via EMS to the emergency department C/O vomiting x multiple times, onset 3.5 hours. Associated sxs include nausea, abdominal pain, decreased appetite, and increased BM frequency. She reports her abdominal pain is different from her typical GERD. Stool is loose but denies any diarrhea. Pt also c/o productive cough. Pt endorses tobacco use. Denies any h/o cardiac issues. Pt denies diarrhea, blood in stool, hematemesis, or any other sxs or complaints. PCP: Doralee Dubin, MD    Current Facility-Administered Medications   Medication Dose Route Frequency Provider Last Rate Last Dose    LORazepam (ATIVAN) 2 mg/mL injection        Stopped at 04/15/18 2240     Current Outpatient Prescriptions   Medication Sig Dispense Refill    potassium chloride (K-DUR, KLOR-CON) 20 mEq tablet Take 20 mEq by mouth daily.  raNITIdine hcl 150 mg capsule Take 150 mg by mouth two (2) times a day.  omeprazole (PRILOSEC) 20 mg capsule Take 20 mg by mouth daily.  ondansetron (ZOFRAN ODT) 4 mg disintegrating tablet Take 1 Tab by mouth every eight (8) hours as needed for Nausea. 20 Tab 0    dicyclomine (BENTYL) 10 mg capsule Take 1 Cap by mouth four (4) times daily for 5 days. 20 Cap 0    losartan (COZAAR) 25 mg tablet Take 25 mg by mouth daily. Indications: hypertension      cyclobenzaprine (FLEXERIL) 5 mg tablet Take 5 mg by mouth three (3) times daily as needed for Muscle Spasm(s).  furosemide (LASIX) 40 mg tablet Take  by mouth Every Mon, Wed and Sat. Indications: Edema      clopidogrel (PLAVIX) 75 mg tablet Take 1 Tab by mouth daily. 30 Tab 0    NIFEdipine ER (PROCARDIA XL) 90 mg ER tablet Take 1 Tab by mouth daily. (Patient taking differently: Take 60 mg by mouth daily.) 30 Tab 0    glimepiride (AMARYL) 1 mg tablet Take 1 tablet by mouth Daily (before breakfast). (Patient taking differently: Take 2 mg by mouth Daily (before breakfast). ) 30 tablet 0    albuterol-ipratropium (DUO-NEB) 2.5 mg-0.5 mg/3 ml nebu 3 mL by Nebulization route every four (4) hours as needed. 30 mL 0    guaiFENesin ER (MUCINEX) 600 mg ER tablet Take 1 Tab by mouth every twelve (12) hours. 20 Tab 0    fluticasone-salmeterol (ADVAIR DISKUS) 250-50 mcg/dose diskus inhaler Take 1 Puff by inhalation every twelve (12) hours. 1 Inhaler 0    albuterol (PROVENTIL VENTOLIN) 2.5 mg /3 mL (0.083 %) nebulizer solution 3 mL by Nebulization route every four (4) hours as needed for Wheezing. 10 Package 0    polyethylene glycol (MIRALAX) 17 gram packet Take 1 Packet by mouth daily as needed for Other (constipation). 30 Each 0    lisinopril (PRINIVIL, ZESTRIL) 20 mg tablet Take 1 Tab by mouth daily. 30 Tab 0    glucose 4 gram chewable tablet Take 4 tablets by mouth as needed. 30 tablet 0    amitriptyline (ELAVIL) 10 mg tablet Take 1 tablet by mouth nightly. (Patient taking differently: Take 25 mg by mouth nightly.) 30 tablet 0    aspirin 81 mg chewable tablet Take 1 Tab by mouth daily.  1 Tab 1       Past History     Past Medical History:  Past Medical History:   Diagnosis Date    Anxiety     Arthritis     DJD    Arthropathy     Asthma     C. difficile diarrhea     [facility] obstructive [facility] disease[facility] [address]     Depression     Depression     Diabetes (HCC)     [facility]deep venous thrombosis) ([facility])     Gastrointestinal disorder     GERD (gastroesophageal reflux disease)     High cholesterol     Hypertension     Pain, chronic     Pneumonia     SOB (shortness of breath)        Past Surgical History:  Past Surgical History:   Procedure Laterality Date    HX CHOLECYSTECTOMY      HX CORONARY STENT PLACEMENT      HX GYN      c section    HX HYSTERECTOMY      HX ORTHOPAEDIC      back surgery    HX OTHER SURGICAL      laminectomy    NEUROLOGICAL PROCEDURE UNLISTED      back surgery       Family History:  History reviewed. No pertinent family history. Social History:  Social History   Substance Use Topics    Smoking status: Current Every Day Smoker     Packs/day: 0.50    Smokeless tobacco: Never Used    Alcohol use Yes      Comment: social       Allergies: Allergies   Allergen Reactions    Codeine Itching    Dilaudid [Hydromorphone (Bulk)] Other (comments)     Feel like i am in another world.  Percocet [Oxycodone-Acetaminophen] Itching    Sulfa (Sulfonamide Antibiotics) Itching         Review of Systems   Review of Systems   Constitutional: Positive for appetite change (decreased). Respiratory: Positive for cough (productive). Gastrointestinal: Positive for abdominal pain, nausea and vomiting. Negative for blood in stool and diarrhea. (+) increased BM frequency  (-) hematemesis   All other systems reviewed and are negative. Physical Exam     Vitals:    04/15/18 2314 04/15/18 2315 04/15/18 2345 04/16/18 0008   BP:  200/90 172/76 182/79   Pulse: 99 97 (!) 104 (!) 102   Resp: 19 24 18    Temp:       SpO2: 97% 98% 94%    Weight:       Height:         Physical Exam   Nursing note and vitals reviewed. Constitutional: Alert. Well appearing, no acute distress  Head: Normocephalic, Atraumatic  Eyes: Pupils are equal, round, and reactive to light, EOMI  ENT: Moist mucous membranes, oropharynx clear. Neck: Supple, non-tender  Cardiovascular: Regular rate and rhythm, no murmurs, rubs, or gallops  Chest: Normal work of breathing and chest excursion bilaterally.   No reproducible chest tenderness. Lungs: Expiratory wheezing in all lung fields with mildly prolonged expiratory phase, wet cough, speaking full sentences, in no respiratory distress  Abdomen: Soft, non tender, non distended, normoactive bowel sounds  Back: No evidence of trauma or deformity. No CVA Tenderness. Extremities: No evidence of trauma or deformity, no LE edema  Skin: Warm and dry  Neuro: Alert and appropriate, facial movement symmetric, normal speech, strength and sensation full and symmetric bilaterally, normal gait, normal coordination  Psychiatric: Normal mood and affect    Diagnostic Study Results     Labs -     Recent Results (from the past 12 hour(s))   URINALYSIS W/ RFLX MICROSCOPIC    Collection Time: 04/15/18  9:16 PM   Result Value Ref Range    Color STRAW      Appearance CLEAR      Specific gravity 1.015 1.003 - 1.030      pH (UA) 7.5 5.0 - 8.0      Protein NEGATIVE  NEG mg/dL    Glucose NEGATIVE  NEG mg/dL    Ketone NEGATIVE  NEG mg/dL    Bilirubin NEGATIVE  NEG      Blood NEGATIVE  NEG      Urobilinogen 0.2 0.2 - 1.0 EU/dL    Nitrites NEGATIVE  NEG      Leukocyte Esterase NEGATIVE  NEG     CBC WITH AUTOMATED DIFF    Collection Time: 04/15/18  9:20 PM   Result Value Ref Range    WBC 8.2 4.6 - 13.2 K/uL    RBC 4.93 4.20 - 5.30 M/uL    HGB 14.4 12.0 - 16.0 g/dL    HCT 43.1 35.0 - 45.0 %    MCV 87.4 74.0 - 97.0 FL    MCH 29.2 24.0 - 34.0 PG    MCHC 33.4 31.0 - 37.0 g/dL    RDW 12.7 11.6 - 14.5 %    PLATELET 011 039 - 737 K/uL    MPV 8.8 (L) 9.2 - 11.8 FL    NEUTROPHILS 59 40 - 73 %    LYMPHOCYTES 33 21 - 52 %    MONOCYTES 6 3 - 10 %    EOSINOPHILS 2 0 - 5 %    BASOPHILS 0 0 - 2 %    ABS. NEUTROPHILS 4.8 1.8 - 8.0 K/UL    ABS. LYMPHOCYTES 2.7 0.9 - 3.6 K/UL    ABS. MONOCYTES 0.5 0.05 - 1.2 K/UL    ABS. EOSINOPHILS 0.2 0.0 - 0.4 K/UL    ABS.  BASOPHILS 0.0 0.0 - 0.06 K/UL    DF AUTOMATED     LIPASE    Collection Time: 04/15/18  9:20 PM   Result Value Ref Range    Lipase 220 73 - 393 U/L   MAGNESIUM    Collection Time: 04/15/18  9:20 PM   Result Value Ref Range    Magnesium 2.2 1.6 - 2.6 mg/dL   METABOLIC PANEL, COMPREHENSIVE    Collection Time: 04/15/18  9:20 PM   Result Value Ref Range    Sodium 143 136 - 145 mmol/L    Potassium 3.3 (L) 3.5 - 5.5 mmol/L    Chloride 103 100 - 108 mmol/L    CO2 33 (H) 21 - 32 mmol/L    Anion gap 7 3.0 - 18 mmol/L    Glucose 135 (H) 74 - 99 mg/dL    BUN 26 (H) 7.0 - 18 MG/DL    Creatinine 1.12 0.6 - 1.3 MG/DL    BUN/Creatinine ratio 23 (H) 12 - 20      GFR est AA 57 (L) >60 ml/min/1.73m2    GFR est non-AA 47 (L) >60 ml/min/1.73m2    Calcium 9.4 8.5 - 10.1 MG/DL    Bilirubin, total 0.2 0.2 - 1.0 MG/DL    ALT (SGPT) 22 13 - 56 U/L    AST (SGOT) 13 (L) 15 - 37 U/L    Alk. phosphatase 105 45 - 117 U/L    Protein, total 7.3 6.4 - 8.2 g/dL    Albumin 3.6 3.4 - 5.0 g/dL    Globulin 3.7 2.0 - 4.0 g/dL    A-G Ratio 1.0 0.8 - 1.7     NT-PRO BNP    Collection Time: 04/15/18  9:20 PM   Result Value Ref Range    NT pro- 0 - 1800 PG/ML   CARDIAC PANEL,(CK, CKMB & TROPONIN)    Collection Time: 04/15/18  9:20 PM   Result Value Ref Range    CK 62 26 - 192 U/L    CK - MB 1.4 <3.6 ng/ml    CK-MB Index 2.3 0.0 - 4.0 %    Troponin-I, Qt. <0.02 0.00 - 0.06 NG/ML       Radiologic Studies -    CT Results  (Last 48 hours)               04/15/18 2255  CT ABD PELV W CONT Final result    Impression:  IMPRESSION:       No acute intra-abdominal process. Narrative:  EXAM: CT of the abdomen and pelvis       INDICATION: Abdominal pain. COMPARISON: April 17, 2012. TECHNIQUE: Axial CT imaging of the abdomen and pelvis was performed with   intravenous contrast. Multiplanar reformats were generated. Dose reduction   techniques used: automated exposure control, adjustment of the mAs and/or kVp   according to patient size, and iterative reconstruction techniques. _______________       FINDINGS:       LOWER CHEST: Unremarkable. LIVER, BILIARY: Liver is normal. No biliary dilation.  There has been a prior cholecystectomy. PANCREAS: Normal.       SPLEEN: Normal.       ADRENALS: Bilateral adrenal gland nodularity is again seen unchanged. KIDNEYS: Normal.       LYMPH NODES: No enlarged lymph nodes. GASTROINTESTINAL TRACT: No bowel dilation or wall thickening. PELVIC ORGANS: Unremarkable. VASCULATURE: There is atherosclerotic plaque throughout the abdominal aorta and   proximal branches. BONES: There is degenerative change throughout the thoracolumbar spine. There is   posterior fusion [id number]. OTHER: None.       _______________               CXR Results  (Last 48 hours)    None            Medical Decision Making   I am the first provider for this patient. I reviewed the vital signs, available nursing notes, past medical history, past surgical history, family history and social history. Vital Signs-Reviewed the patient's vital signs. Pulse Oximetry Analysis - 97% on RA     Records Reviewed: Nursing Notes    Provider Notes (Medical Decision Making):     Procedures:  Procedures    MEDICATIONS GIVEN:  Medications   LORazepam (ATIVAN) 2 mg/mL injection (0 mg  Held 4/15/18 2240)   ondansetron (ZOFRAN) injection 4 mg (4 mg IntraVENous Given 4/15/18 2201)   sodium chloride 0.9 % bolus infusion 500 mL (0 mL IntraVENous IV Completed 4/15/18 2320)   albuterol-ipratropium (DUO-NEB) 2.5 MG-0.5 MG/3 ML (3 mL Nebulization Given 4/15/18 2314)   iopamidol (ISOVUE 300) 61 % contrast injection  mL (100 mL IntraVENous Given 4/15/18 2231)   LORazepam (ATIVAN) injection 0.5 mg (0.5 mg IntraVENous Given 4/15/18 2240)   losartan (COZAAR) tablet 25 mg (25 mg Oral Given 4/15/18 2352)   NIFEdipine ER (PROCARDIA XL) tablet 90 mg (90 mg Oral Given 4/15/18 2353)   lisinopril (PRINIVIL, ZESTRIL) tablet 20 mg (20 mg Oral Given 4/16/18 0008)       ED Course:   9:27 PM   Initial assessment performed.  The patients presenting problems have been discussed, and they are in agreement with the care plan formulated and outlined with them. I have encouraged them to ask questions as they arise throughout their visit. 11:57 PM  Pt states that she feels better. Diagnosis and Disposition     Discussion:  81 y.o. female presents with abdominal pain, N/V/D likely consistent with viral illness. She was mildly tachycardic, this improved with IV fluids. Vital signs are otherwise stable. Labs are reassuring for no acute process. CT scan of abdomen was negative. She feels improved after nausea medication and IV fluids and is tolerating po fluids without difficulty. Will discharge with PCP follow up and Zofran. Return precautions provided. DISCHARGE NOTE:  12:00 AM  Patsy Cotton's  results have been reviewed with her. She has been counseled regarding her diagnosis, treatment, and plan. She verbally conveys understanding and agreement of the signs, symptoms, diagnosis, treatment and prognosis and additionally agrees to follow up as discussed. She also agrees with the care-plan and conveys that all of her questions have been answered. I have also provided discharge instructions for her that include: educational information regarding their diagnosis and treatment, and list of reasons why they would want to return to the ED prior to their follow-up appointment, should her condition change. She has been provided with education for proper emergency department utilization. CLINICAL IMPRESSION:    1. Abdominal pain, epigastric    2. Non-intractable vomiting with nausea, unspecified vomiting type    3. Diarrhea, unspecified type    4. Viral syndrome    5. Essential hypertension    6. Chronic bronchitis, unspecified [facility]itis type ([facility]        PLAN:  1. D/C Home  2.    Discharge Medication List as of 4/16/2018 12:00 AM      START taking these medications    Details   ondansetron (ZOFRAN ODT) 4 mg disintegrating tablet Take 1 Tab by mouth every eight (8) hours as needed for Nausea., Normal, Disp-20 Tab, R-0 dicyclomine (BENTYL) 10 mg capsule Take 1 Cap by mouth four (4) times daily for 5 days. , Normal, Disp-20 Cap, R-0         CONTINUE these medications which have NOT CHANGED    Details   potassium chloride (K-DUR, KLOR-CON) 20 mEq tablet Take 20 mEq by mouth daily. , Historical Med      raNITIdine hcl 150 mg capsule Take 150 mg by mouth two (2) times a day., Historical Med      omeprazole (PRILOSEC) 20 mg capsule Take 20 mg by mouth daily. , Historical Med      losartan (COZAAR) 25 mg tablet Take 25 mg by mouth daily. Indications: hypertension, Historical Med      cyclobenzaprine (FLEXERIL) 5 mg tablet Take 5 mg by mouth three (3) times daily as needed for Muscle Spasm(s). , Historical Med      furosemide (LASIX) 40 mg tablet Take  by mouth Every Mon, Wed and Sat. Indications: Edema, Historical Med      clopidogrel (PLAVIX) 75 mg tablet Take 1 Tab by mouth daily. , Print, Disp-30 Tab, R-0      NIFEdipine ER (PROCARDIA XL) 90 mg ER tablet Take 1 Tab by mouth daily. , Print, Disp-30 Tab, R-0      glimepiride (AMARYL) 1 mg tablet Take 1 tablet by mouth Daily (before breakfast). , No Print, Disp-30 tablet, R-0      albuterol-ipratropium (DUO-NEB) 2.5 mg-0.5 mg/3 ml nebu 3 mL by Nebulization route every four (4) hours as needed. , Print, Disp-30 mL, R-0      guaiFENesin ER (MUCINEX) 600 mg ER tablet Take 1 Tab by mouth every twelve (12) hours. , Print, Disp-20 Tab, R-0      fluticasone-salmeterol (ADVAIR DISKUS) 250-50 mcg/dose diskus inhaler Take 1 Puff by inhalation every twelve (12) hours. , Print, Disp-1 Inhaler, R-0      albuterol (PROVENTIL VENTOLIN) 2.5 mg /3 mL (0.083 %) nebulizer solution 3 mL by Nebulization route every four (4) hours as needed for Wheezing., Print, Disp-10 Package, R-0      polyethylene glycol (MIRALAX) 17 gram packet Take 1 Packet by mouth daily as needed for Other (constipation). , Print, Disp-30 Each, R-0      lisinopril (PRINIVIL, ZESTRIL) 20 mg tablet Take 1 Tab by mouth daily. , Print, Disp-30 Tab, R-0      glucose 4 gram chewable tablet Take 4 tablets by mouth as needed., No Print, Disp-30 tablet, R-0      amitriptyline (ELAVIL) 10 mg tablet Take 1 tablet by mouth nightly., No Print, Disp-30 tablet, R-0      aspirin 81 mg chewable tablet Take 1 Tab by mouth daily. , Normal, Disp-1 Tab, R-1           3. Follow-up Information     Follow up With Details Comments Contact Info    Abe Chopra MD Schedule an appointment as soon as possible for a visit in 2 days For primary care follow up [address]  [id number] [facility], [address]      [facility]  As needed, If symptoms worsen [address] [id number]  [phone]        _______________________________    Attestations: This note is prepared by Nora Rahman, acting as Scribe for Niall Arrington MD.    Niall Arrington MD:  The scribe's documentation has been prepared under my direction and personally reviewed by me in its entirety. I confirm that the note above accurately reflects all work, treatment, procedures, and medical decision making performed by me.  _______________________________      [id number] - Patient called stated that she had sinus congestion. Abd pain improving. Instructed patient that if symptoms concerning or needing additional treatment to come back to ER for repeat evaluation.   Patient hung up on me during instructions

## 2018-04-16 NOTE — ED NOTES
Pt hourly rounding competed. Safety   Pt (x) resting on stretcher with side rails up and call bell in reach. () in chair    () in parents arms. Toileting   Pt offered ()Bedpan     (x)Assistance to Restroom, declined     ()Urinal  Ongoing Updates  Updated on plan of care and status of test results.   Pain Management  Inquired as to comfort and offered comfort measures:    (x) warm blankets   () dimmed lights

## 2018-04-16 NOTE — ED TRIAGE NOTES
Pt brought in by EMS. Pt complains of vomiting and abdominal pain. Pt with vomiting this morning and this evening and during transport. Blood Glucose 179mg/dl in transport. Zofran 4mg in transport.

## 2023-01-01 ENCOUNTER — OUT OF OFFICE VISIT (OUTPATIENT)
Dept: URBAN - METROPOLITAN AREA MEDICAL CENTER 27 | Facility: MEDICAL CENTER | Age: 88
End: 2023-01-01
Payer: MEDICARE

## 2023-01-01 DIAGNOSIS — K31.89 ACQUIRED DEFORMITY OF DUODENUM: ICD-10-CM

## 2023-01-01 DIAGNOSIS — K92.0 BLOODY EMESIS: ICD-10-CM

## 2023-01-01 DIAGNOSIS — D53.8 OTHER SPECIFIED NUTRITIONAL ANEMIAS: ICD-10-CM

## 2023-01-01 DIAGNOSIS — R11.2 ACUTE NAUSEA WITH NONBILIOUS VOMITING: ICD-10-CM

## 2023-01-01 PROCEDURE — 99222 1ST HOSP IP/OBS MODERATE 55: CPT | Performed by: INTERNAL MEDICINE

## 2023-01-01 PROCEDURE — G8427 DOCREV CUR MEDS BY ELIG CLIN: HCPCS | Performed by: INTERNAL MEDICINE

## 2023-01-01 PROCEDURE — 43239 EGD BIOPSY SINGLE/MULTIPLE: CPT | Performed by: INTERNAL MEDICINE

## 2023-01-02 ENCOUNTER — OUT OF OFFICE VISIT (OUTPATIENT)
Dept: URBAN - METROPOLITAN AREA MEDICAL CENTER 27 | Facility: MEDICAL CENTER | Age: 88
End: 2023-01-02
Payer: MEDICARE

## 2023-01-02 ENCOUNTER — LAB OUTSIDE AN ENCOUNTER (OUTPATIENT)
Dept: URBAN - METROPOLITAN AREA CLINIC 23 | Facility: CLINIC | Age: 88
End: 2023-01-02

## 2023-01-02 DIAGNOSIS — E87.6 ACUTE HYPOKALEMIA: ICD-10-CM

## 2023-01-02 DIAGNOSIS — K92.0 BLOODY EMESIS: ICD-10-CM

## 2023-01-02 DIAGNOSIS — Z79.02 ANTIPLATELET OR ANTITHROMBOTIC LONG-TERM USE: ICD-10-CM

## 2023-01-02 DIAGNOSIS — D50.9 ANEMIA: ICD-10-CM

## 2023-01-02 PROCEDURE — 99232 SBSQ HOSP IP/OBS MODERATE 35: CPT | Performed by: INTERNAL MEDICINE

## 2023-01-02 PROCEDURE — 99233 SBSQ HOSP IP/OBS HIGH 50: CPT | Performed by: INTERNAL MEDICINE
